# Patient Record
Sex: MALE | Race: WHITE
[De-identification: names, ages, dates, MRNs, and addresses within clinical notes are randomized per-mention and may not be internally consistent; named-entity substitution may affect disease eponyms.]

---

## 2017-04-07 ENCOUNTER — HOSPITAL ENCOUNTER (OUTPATIENT)
Dept: HOSPITAL 79 - EMI | Age: 67
End: 2017-04-07
Attending: INTERNAL MEDICINE
Payer: COMMERCIAL

## 2017-04-07 DIAGNOSIS — M25.561: Primary | ICD-10-CM

## 2017-04-07 DIAGNOSIS — M51.36: ICD-10-CM

## 2017-04-07 DIAGNOSIS — M47.816: ICD-10-CM

## 2017-04-07 DIAGNOSIS — M99.04: ICD-10-CM

## 2017-04-07 DIAGNOSIS — M25.562: ICD-10-CM

## 2017-08-25 ENCOUNTER — OFFICE VISIT (OUTPATIENT)
Dept: UROLOGY | Facility: CLINIC | Age: 67
End: 2017-08-25

## 2017-08-25 VITALS
DIASTOLIC BLOOD PRESSURE: 75 MMHG | HEIGHT: 67 IN | HEART RATE: 67 BPM | SYSTOLIC BLOOD PRESSURE: 120 MMHG | WEIGHT: 189 LBS | BODY MASS INDEX: 29.66 KG/M2

## 2017-08-25 DIAGNOSIS — N20.0 KIDNEY STONES: Primary | ICD-10-CM

## 2017-08-25 LAB
BILIRUB BLD-MCNC: NORMAL MG/DL
CLARITY, POC: CLEAR
COLOR UR: YELLOW
GLUCOSE UR STRIP-MCNC: NEGATIVE MG/DL
KETONES UR QL: NEGATIVE
LEUKOCYTE EST, POC: NEGATIVE
NITRITE UR-MCNC: NEGATIVE MG/ML
PH UR: 6 [PH] (ref 5–8)
PROT UR STRIP-MCNC: NEGATIVE MG/DL
RBC # UR STRIP: NEGATIVE /UL
SP GR UR: 1.02 (ref 1–1.03)
UROBILINOGEN UR QL: NORMAL

## 2017-08-25 PROCEDURE — 99204 OFFICE O/P NEW MOD 45 MIN: CPT | Performed by: NURSE PRACTITIONER

## 2017-08-25 PROCEDURE — 81003 URINALYSIS AUTO W/O SCOPE: CPT | Performed by: NURSE PRACTITIONER

## 2017-08-25 RX ORDER — GABAPENTIN 300 MG/1
CAPSULE ORAL
Refills: 0 | COMMUNITY
Start: 2017-08-03

## 2017-08-25 RX ORDER — OLMESARTAN MEDOXOMIL 20 MG/1
40 TABLET ORAL DAILY
COMMUNITY

## 2017-08-25 RX ORDER — TRAMADOL HYDROCHLORIDE 50 MG/1
TABLET ORAL
Refills: 0 | COMMUNITY
Start: 2017-08-03

## 2017-08-25 NOTE — PROGRESS NOTES
Chief Complaint:          Chief Complaint   Patient presents with   • Flank Pain       HPI:   66 y.o. male being seen with his wife today for history of left sided pain secondary to a 3.88 cm left staghorn calculus and multiple additional left renal stones involving the left mid to lower pole of the kidney diagnosed per CT scan of the abdomen and pelvis stone study at [facility] in [facility] on 07/24/2017 ordered by Dr. Segura. Patient states he has difficulty getting to [place] and would like to be seen here. He does complain of some left back pain but denies any difficulty with urination. He reports he has passed several small stone fragments. He has had a ESWL in the past for treatment of a small stone. Patient states he is scheduled for a consult by a urologist prior to being scheduled for a percutaneous nephrolithotomy for treatment of the large staghorn stone. Patient reports also he has had an elevated PSA of 9.91 but had a repeat PSA a few days ago and does not know the result of that PSA completed while he was in [place] and was recommended to have a prostate biopsy.    HPI        Past Medical History:        Past Medical History:   Diagnosis Date   • Heart attack    • Hypertension          Current Meds:     Current Outpatient Prescriptions   Medication Sig Dispense Refill   • gabapentin (NEURONTIN) 300 MG capsule take 1 capsule by mouth three times a day  0   • olmesartan (BENICAR) 20 MG tablet Take 40 mg by mouth Daily.     • traMADol (ULTRAM) 50 MG tablet take 1 tablet by mouth twice a day if needed  0     No current facility-administered medications for this visit.         Allergies:      Allergies   Allergen Reactions   • Arithmin [Antazoline]    • Morphine And Related         Past Surgical History:     Past Surgical History:   Procedure Laterality Date   • CARDIAC SURGERY     • CARPAL TUNNEL RELEASE WITH CUBITAL TUNNEL RELEASE Bilateral    • CYSTOSCOPY W/ URETEROSCOPY W/  LITHOTRIPSY           Social History:     Social History     Social History   • Marital status: Unknown     Spouse name: N/A   • Number of children: N/A   • Years of education: N/A     Occupational History   • Not on file.     Social History Main Topics   • Smoking status: Current Every Day Smoker     Types: Pipe   • Smokeless tobacco: Never Used   • Alcohol use Yes   • Drug use: No   • Sexual activity: Not on file     Other Topics Concern   • Not on file     Social History Narrative   • No narrative on file       Family History:     Family History   Problem Relation Age of Onset   • Heart disease Father    • Stroke Father    • Heart disease Mother        Review of Systems:     Review of Systems   Constitutional: Negative for chills, fatigue and fever.   Respiratory: Negative for cough, shortness of breath and wheezing.    Cardiovascular: Negative for leg swelling.   Gastrointestinal: Positive for abdominal pain. Negative for nausea and vomiting.   Musculoskeletal: Positive for back pain. Negative for joint swelling.   Neurological: Negative for dizziness and headaches.   Psychiatric/Behavioral: Negative for confusion.       Physical Exam:     Physical Exam   Constitutional: He is oriented to person, place, and time. He appears well-developed and well-nourished. No distress.   Abdominal: Soft. Bowel sounds are normal. He exhibits no distension and no mass. There is no tenderness. There is no rebound and no guarding. No hernia.   Musculoskeletal: Normal range of motion.   Neurological: He is alert and oriented to person, place, and time.   Skin: Skin is warm and dry. No rash noted. He is not diaphoretic. No pallor.   Psychiatric: He has a normal mood and affect. His behavior is normal. Judgment and thought content normal.   Nursing note and vitals reviewed.      Procedure:     No notes on file      Assessment:     Encounter Diagnosis   Name Primary?   • Kidney stones Yes     Orders Placed This Encounter   Procedures    • POC Urinalysis Dipstick, Automated       Plan:   Recommend the patient keep his scheduled appointment for in the percutaneous nephrolithotomy for treatment of 3.88 cm left renal stone along with other smaller stones within the left kidney. Further recommend he discussed with his surgeon the possibility of having the prostate biopsy while he is under sedation having the left renal stone treated. Patient states he would talk to his provider regarding the possibility of the prostate biopsy while asleep. Further informed patient after he has been cleared by his doctors in [place] he could have his records transferred to the services of Dr. Montes since this would be easier commute for him and his wife.    Counseling was given to patient and family for the following topics diagnostic results, patient and family education, impressions and risks and benefits of treatment options. and the interim medical history and current results were reviewed.  A treatment plan with follow-up was made. Total time of the encounter was 45 minutes and 45 minutes were spent discussing Kidney stones [N20.0] face-to-face.       This document has been electronically signed by LONDON Fernandez August 25, 2017 2:30 PM

## 2017-10-10 ENCOUNTER — OFFICE VISIT (OUTPATIENT)
Dept: UROLOGY | Facility: CLINIC | Age: 67
End: 2017-10-10

## 2017-10-10 DIAGNOSIS — N20.0 RENAL CALCULUS: Primary | ICD-10-CM

## 2017-10-10 DIAGNOSIS — R97.20 ELEVATED PROSTATE SPECIFIC ANTIGEN (PSA): ICD-10-CM

## 2017-10-10 PROCEDURE — 99214 OFFICE O/P EST MOD 30 MIN: CPT | Performed by: UROLOGY

## 2017-10-10 NOTE — PROGRESS NOTES
Chief Complaint:          Chief Complaint   Patient presents with   • Nephrolithiasis       HPI:   66 y.o. male.  66-year-old white male who was referred to the [facility] for percutaneous nephrostolithotomy on the area and it was 3.8 cm stone.  He also had a high PSA and was to have a prostate biopsy apparently is referred immediately postop from  for tube removal and I spoke personally with his doctor Dr. Marcial Valladares.  He indicated there was a stent in he left the fragment easy to do ureteroscopy to obtain the last fragment he did not recommend a nephrostogram we discussed the fact that the Myrick I removed it without complication.  I'll see him back in a month to address his prostate there is no fevers chills nausea vomiting etc.        Past Medical History:        Past Medical History:   Diagnosis Date   • Heart attack    • Hypertension          Current Meds:     Current Outpatient Prescriptions   Medication Sig Dispense Refill   • gabapentin (NEURONTIN) 300 MG capsule take 1 capsule by mouth three times a day  0   • olmesartan (BENICAR) 20 MG tablet Take 40 mg by mouth Daily.     • traMADol (ULTRAM) 50 MG tablet take 1 tablet by mouth twice a day if needed  0     No current facility-administered medications for this visit.         Allergies:      Allergies   Allergen Reactions   • Arithmin [Antazoline]    • Morphine And Related         Past Surgical History:     Past Surgical History:   Procedure Laterality Date   • CARDIAC SURGERY     • CARPAL TUNNEL RELEASE WITH CUBITAL TUNNEL RELEASE Bilateral    • CYSTOSCOPY W/ URETEROSCOPY W/ LITHOTRIPSY           Social History:     Social History     Social History   • Marital status: Unknown     Spouse name: N/A   • Number of children: N/A   • Years of education: N/A     Occupational History   • Not on file.     Social History Main Topics   • Smoking status: Current Every Day Smoker     Types: Pipe   • Smokeless tobacco: Never Used   • Alcohol use Yes   •  Drug use: No   • Sexual activity: Not on file     Other Topics Concern   • Not on file     Social History Narrative       Family History:     Family History   Problem Relation Age of Onset   • Heart disease Father    • Stroke Father    • Heart disease Mother        Review of Systems:     Review of Systems   Constitutional: Negative.    HENT: Negative.    Eyes: Negative.    Respiratory: Negative.    Cardiovascular: Negative.    Gastrointestinal: Negative.    Endocrine: Negative.    Musculoskeletal: Negative.    Allergic/Immunologic: Negative.    Neurological: Negative.    Hematological: Negative.    Psychiatric/Behavioral: Negative.        Physical Exam:     Physical Exam   Constitutional: He is oriented to person, place, and time. He appears well-developed and well-nourished.   HENT:   Head: Normocephalic and atraumatic.   Eyes: Conjunctivae and EOM are normal. Pupils are equal, round, and reactive to light.   Neck: Normal range of motion.   Cardiovascular: Normal rate, regular rhythm, normal heart sounds and intact distal pulses.    Pulmonary/Chest: Effort normal and breath sounds normal.   Abdominal: Soft. Bowel sounds are normal.   Musculoskeletal: Normal range of motion.   Neurological: He is alert and oriented to person, place, and time. He has normal reflexes.   Skin: Skin is warm and dry.   Psychiatric: He has a normal mood and affect. His behavior is normal. Judgment and thought content normal.   Nursing note and vitals reviewed.      Procedure:       Assessment:   No diagnosis found.  No orders of the defined types were placed in this encounter.      Plan:   Staghorn calculus status post percutaneous nephrostolithotomy has an indwelling double-J stent endoscopically go back to the [facility] for intervention  Elevated prostate specific antigen-we discussed the diagnosis of elevated prostate-specific antigen.  I explained the pathophysiology of PSA.  It is a serine protease that's function in the  male reproductive tract is to facilitate the liquefaction of semen.  It is for this reason the body does not want it freely floating in the serum and why typically bound tightly to albumin.  We discussed why we used both a PSA free and total to determine the need for more aggressive therapy I discussed the normal range.  Additionally it was in the range of 1-4 but more recently has been downgraded to something less than 2 or even approaching 1.  I discussed the risk of family history particularly the fact that the average male has a 14% risk of prostate cancer and that in the face of a positive diagnosis in a father it will tablet and any other first-generation relative continued tablet insofar that a father and brother with prostate cancer will produce almost a 50% risk of prostate cancer.  I discussed the use of the temporal use of PSA as the best option for monitoring.  We also discussed the fact that an elevated PSA is an isolated event does not mean that this is prostate cancer and should not engender worry in this regard. I discussed other things that can elevate PSA including constipation, prostatitis, infection, recent intercourse etc. as well as the risks and benefits associated with this.  Also discussed the fact that this is with a dilutional test and as a consequence of such were present produce slight variations on a single specimen.  Further discussed the risks and benefits of a prostate biopsy as well.           This document has been electronically signed by DEBORAH ROBERSON MD October 10, 2017 10:46 AM

## 2017-10-11 PROBLEM — N20.0 RENAL CALCULUS: Status: ACTIVE | Noted: 2017-10-11

## 2017-10-11 PROBLEM — R97.20 ELEVATED PROSTATE SPECIFIC ANTIGEN (PSA): Status: ACTIVE | Noted: 2017-10-11

## 2017-11-17 ENCOUNTER — OFFICE VISIT (OUTPATIENT)
Dept: UROLOGY | Facility: CLINIC | Age: 67
End: 2017-11-17

## 2017-11-17 VITALS
WEIGHT: 189 LBS | HEART RATE: 67 BPM | BODY MASS INDEX: 29.66 KG/M2 | DIASTOLIC BLOOD PRESSURE: 75 MMHG | HEIGHT: 67 IN | SYSTOLIC BLOOD PRESSURE: 120 MMHG

## 2017-11-17 DIAGNOSIS — R97.20 ELEVATED PROSTATE SPECIFIC ANTIGEN (PSA): Primary | ICD-10-CM

## 2017-11-17 DIAGNOSIS — N20.0 RENAL CALCULUS: ICD-10-CM

## 2017-11-17 PROCEDURE — 36415 COLL VENOUS BLD VENIPUNCTURE: CPT | Performed by: UROLOGY

## 2017-11-17 PROCEDURE — 84153 ASSAY OF PSA TOTAL: CPT | Performed by: UROLOGY

## 2017-11-17 PROCEDURE — 84154 ASSAY OF PSA FREE: CPT | Performed by: UROLOGY

## 2017-11-17 PROCEDURE — 99213 OFFICE O/P EST LOW 20 MIN: CPT | Performed by: UROLOGY

## 2017-11-17 NOTE — PROGRESS NOTES
Chief Complaint:          Chief Complaint   Patient presents with   • Nephrolithiasis       HPI:   66 y.o. male.  66-year-old white male who was referred to the [facility] for percutaneous nephrostolithotomy on the area and it was 3.8 cm stone.  He also had a high PSA and was to have a prostate biopsy apparently is referred immediately postop from  for tube removal and I spoke personally with his doctor Dr. Marcial Valladares.  He indicated there was a stent in he left the fragment easy to do ureteroscopy to obtain the last fragment he did not recommend a nephrostogram we discussed the fact that the Myrick I removed it without complication.  I'll see him back in a month to address his prostate there is no fevers chills nausea vomiting etc.   He returns today he's now stone free he had a percutaneous nephrostolithotomy at the [facility] followed by ureteroscopy he is due to have a checkup an ultrasound on 12 8.  He was on alpha blockade but he stopped he currently has no lower urinary tract symptomatology no blood in the urine no family history of prostate cancer his PSA has gone from 9-76 to have that evaluated.  He has severe hemorrhoids since his surgery.        Past Medical History:        Past Medical History:   Diagnosis Date   • Heart attack    • Hypertension          Current Meds:     Current Outpatient Prescriptions   Medication Sig Dispense Refill   • gabapentin (NEURONTIN) 300 MG capsule take 1 capsule by mouth three times a day  0   • olmesartan (BENICAR) 20 MG tablet Take 40 mg by mouth Daily.     • traMADol (ULTRAM) 50 MG tablet take 1 tablet by mouth twice a day if needed  0     No current facility-administered medications for this visit.         Allergies:      Allergies   Allergen Reactions   • Arithmin [Antazoline]    • Morphine And Related         Past Surgical History:     Past Surgical History:   Procedure Laterality Date   • CARDIAC SURGERY     • CARPAL TUNNEL RELEASE WITH  CUBITAL TUNNEL RELEASE Bilateral    • CYSTOSCOPY W/ URETEROSCOPY W/ LITHOTRIPSY           Social History:     Social History     Social History   • Marital status: Unknown     Spouse name: N/A   • Number of children: N/A   • Years of education: N/A     Occupational History   • Not on file.     Social History Main Topics   • Smoking status: Current Every Day Smoker     Types: Pipe   • Smokeless tobacco: Never Used   • Alcohol use Yes   • Drug use: No   • Sexual activity: Not on file     Other Topics Concern   • Not on file     Social History Narrative       Family History:     Family History   Problem Relation Age of Onset   • Heart disease Father    • Stroke Father    • Heart disease Mother        Review of Systems:     Review of Systems   Constitutional: Negative.    HENT: Negative.    Eyes: Negative.    Respiratory: Negative.    Cardiovascular: Negative.    Gastrointestinal: Negative.    Endocrine: Negative.    Musculoskeletal: Negative.    Allergic/Immunologic: Negative.    Neurological: Negative.    Hematological: Negative.    Psychiatric/Behavioral: Negative.        Physical Exam:     Physical Exam   Constitutional: He is oriented to person, place, and time. He appears well-developed and well-nourished.   HENT:   Head: Normocephalic and atraumatic.   Eyes: Conjunctivae and EOM are normal. Pupils are equal, round, and reactive to light.   Neck: Normal range of motion.   Cardiovascular: Normal rate, regular rhythm, normal heart sounds and intact distal pulses.    Pulmonary/Chest: Effort normal and breath sounds normal.   Abdominal: Soft. Bowel sounds are normal.   Genitourinary: Rectum normal, prostate normal and penis normal.   Genitourinary Comments: Soft nontender abdomen with no organomegaly, rigidity, or tenderness.  He has normal external genitalia and uncircumcised phallus with a freely movable foreskin bilaterally descended testes without masses there is no inguinal hernias adenopathy or abnormalities he  had good rectal tone and a large smooth firm prostate.  There is no nodularity or any suspicious rectal abnormalities     Musculoskeletal: Normal range of motion.   Neurological: He is alert and oriented to person, place, and time. He has normal reflexes.   Skin: Skin is warm and dry.   Psychiatric: He has a normal mood and affect. His behavior is normal. Judgment and thought content normal.   Nursing note and vitals reviewed.      Procedure:       Assessment:   No diagnosis found.  No orders of the defined types were placed in this encounter.      Plan:   Elevated prostate specific antigen-we discussed the diagnosis of elevated prostate-specific antigen.  I explained the pathophysiology of PSA.  It is a serine protease that's function in the male reproductive tract is to facilitate the liquefaction of semen.  It is for this reason the body does not want it freely floating in the serum and why typically bound tightly to albumin.  We discussed why we used both a PSA free and total to determine the need for more aggressive therapy I discussed the normal range.  Additionally it was in the range of 1-4 but more recently has been downgraded to something less than 2 or even approaching 1.  I discussed the risk of family history particularly the fact that the average male has a 14% risk of prostate cancer and that in the face of a positive diagnosis in a father it will tablet and any other first-generation relative continued tablet insofar that a father and brother with prostate cancer will produce almost a 50% risk of prostate cancer.  I discussed the use of the temporal use of PSA as the best option for monitoring.  We also discussed the fact that an elevated PSA is an isolated event does not mean that this is prostate cancer and should not engender worry in this regard. I discussed other things that can elevate PSA including constipation, prostatitis, infection, recent intercourse etc. as well as the risks and benefits  associated with this.  Also discussed the fact that this is with a dilutional test and as a consequence of such were present produce slight variations on a single specimen.  Further discussed the risks and benefits of a prostate biopsy as well.  Renal stone status post treatment           This document has been electronically signed by DEBORAH ROBERSON MD November 17, 2017 9:58 AM

## 2017-11-19 LAB
PSA FREE MFR SERPL: 23.2 %
PSA FREE SERPL-MCNC: 1.65 NG/ML
PSA SERPL-MCNC: 7.1 NG/ML (ref 0–4)

## 2017-12-01 ENCOUNTER — OFFICE VISIT (OUTPATIENT)
Dept: UROLOGY | Facility: CLINIC | Age: 67
End: 2017-12-01

## 2017-12-01 DIAGNOSIS — R97.20 ELEVATED PROSTATE SPECIFIC ANTIGEN (PSA): Primary | ICD-10-CM

## 2017-12-01 PROCEDURE — 99213 OFFICE O/P EST LOW 20 MIN: CPT | Performed by: UROLOGY

## 2017-12-01 NOTE — PROGRESS NOTES
Chief Complaint:          Chief Complaint   Patient presents with   • Elevated PSA       HPI:   67 y.o. male.  66-year-old white male who was referred to the [facility] for percutaneous nephrostolithotomy on the area and it was 3.8 cm stone.  He also had a high PSA and was to have a prostate biopsy apparently is referred immediately postop from  for tube removal and I spoke personally with his doctor Dr. Marcial Valladares.  He indicated there was a stent in he left the fragment easy to do ureteroscopy to obtain the last fragment he did not recommend a nephrostogram we discussed the fact that the Myrick I removed it without complication.  I'll see him back in a month to address his prostate there is no fevers chills nausea vomiting etc.   He returns today he's now stone free he had a percutaneous nephrostolithotomy at the [facility] followed by ureteroscopy he is due to have a checkup an ultrasound on 12 8.  He was on alpha blockade but he stopped he currently has no lower urinary tract symptomatology no blood in the urine no family history of prostate cancer his PSA has gone from 9-76 to have that evaluated.  He has severe hemorrhoids since his surgery.  He returns today's repeat PSA is now down to 7 with a free PSA of 23.2% I believe it's going down the discussed the options I do not recommend intervention which would be consistent with the see back in 6 months I gave him reassurance        Past Medical History:        Past Medical History:   Diagnosis Date   • Heart attack    • Hypertension          Current Meds:     Current Outpatient Prescriptions   Medication Sig Dispense Refill   • gabapentin (NEURONTIN) 300 MG capsule take 1 capsule by mouth three times a day  0   • olmesartan (BENICAR) 20 MG tablet Take 40 mg by mouth Daily.     • traMADol (ULTRAM) 50 MG tablet take 1 tablet by mouth twice a day if needed  0     No current facility-administered medications for this visit.          Allergies:      Allergies   Allergen Reactions   • Arithmin [Antazoline]    • Morphine And Related         Past Surgical History:     Past Surgical History:   Procedure Laterality Date   • CARDIAC SURGERY     • CARPAL TUNNEL RELEASE WITH CUBITAL TUNNEL RELEASE Bilateral    • CYSTOSCOPY W/ URETEROSCOPY W/ LITHOTRIPSY           Social History:     Social History     Social History   • Marital status: Unknown     Spouse name: N/A   • Number of children: N/A   • Years of education: N/A     Occupational History   • Not on file.     Social History Main Topics   • Smoking status: Current Every Day Smoker     Types: Pipe   • Smokeless tobacco: Never Used   • Alcohol use Yes   • Drug use: No   • Sexual activity: Not on file     Other Topics Concern   • Not on file     Social History Narrative       Family History:     Family History   Problem Relation Age of Onset   • Heart disease Father    • Stroke Father    • Heart disease Mother        Review of Systems:     Review of Systems   Constitutional: Negative.    HENT: Negative.    Eyes: Negative.    Respiratory: Negative.    Cardiovascular: Negative.    Gastrointestinal: Negative.    Endocrine: Negative.    Musculoskeletal: Negative.    Allergic/Immunologic: Negative.    Neurological: Negative.    Hematological: Negative.    Psychiatric/Behavioral: Negative.        Physical Exam:     Physical Exam   Constitutional: He is oriented to person, place, and time. He appears well-developed and well-nourished.   HENT:   Head: Normocephalic and atraumatic.   Eyes: Conjunctivae and EOM are normal. Pupils are equal, round, and reactive to light.   Neck: Normal range of motion.   Cardiovascular: Normal rate, regular rhythm, normal heart sounds and intact distal pulses.    Pulmonary/Chest: Effort normal and breath sounds normal.   Abdominal: Soft. Bowel sounds are normal.   Musculoskeletal: Normal range of motion.   Neurological: He is alert and oriented to person, place, and time. He  has normal reflexes.   Skin: Skin is warm and dry.   Psychiatric: He has a normal mood and affect. His behavior is normal. Judgment and thought content normal.   Nursing note and vitals reviewed.      Procedure:       Assessment:   No diagnosis found.  No orders of the defined types were placed in this encounter.      Plan:   Elevated prostate specific antigen-we discussed the diagnosis of elevated prostate-specific antigen.  I explained the pathophysiology of PSA.  It is a serine protease that's function in the male reproductive tract is to facilitate the liquefaction of semen.  It is for this reason the body does not want it freely floating in the serum and why typically bound tightly to albumin.  We discussed why we used both a PSA free and total to determine the need for more aggressive therapy I discussed the normal range.  Additionally it was in the range of 1-4 but more recently has been downgraded to something less than 2 or even approaching 1.  I discussed the risk of family history particularly the fact that the average male has a 14% risk of prostate cancer and that in the face of a positive diagnosis in a father it will tablet and any other first-generation relative continued tablet insofar that a father and brother with prostate cancer will produce almost a 50% risk of prostate cancer.  I discussed the use of the temporal use of PSA as the best option for monitoring.  We also discussed the fact that an elevated PSA is an isolated event does not mean that this is prostate cancer and should not engender worry in this regard. I discussed other things that can elevate PSA including constipation, prostatitis, infection, recent intercourse etc. as well as the risks and benefits associated with this.  Also discussed the fact that this is with a dilutional test and as a consequence of such were present produce slight variations on a single specimen.  Further discussed the risks and benefits of a prostate biopsy  as well.  PSA is not dropping that's consistent with his diagnosis of BPH and he's had a large amount of urologic intervention which could easily be responsible for making the PSA go up           This document has been electronically signed by DEBORAH ROBERSON MD December 1, 2017 9:48 AM

## 2018-06-01 ENCOUNTER — OFFICE VISIT (OUTPATIENT)
Dept: UROLOGY | Facility: CLINIC | Age: 68
End: 2018-06-01

## 2018-06-01 VITALS — WEIGHT: 189 LBS | HEIGHT: 67 IN | BODY MASS INDEX: 29.66 KG/M2

## 2018-06-01 DIAGNOSIS — R97.20 ELEVATED PROSTATE SPECIFIC ANTIGEN (PSA): Primary | ICD-10-CM

## 2018-06-01 DIAGNOSIS — N20.0 RENAL CALCULUS: ICD-10-CM

## 2018-06-01 LAB — PSA SERPL-MCNC: 4.86 NG/ML (ref 0–4)

## 2018-06-01 PROCEDURE — 84153 ASSAY OF PSA TOTAL: CPT | Performed by: UROLOGY

## 2018-06-01 PROCEDURE — 36415 COLL VENOUS BLD VENIPUNCTURE: CPT | Performed by: UROLOGY

## 2018-06-01 PROCEDURE — 99213 OFFICE O/P EST LOW 20 MIN: CPT | Performed by: UROLOGY

## 2018-06-01 PROCEDURE — 99406 BEHAV CHNG SMOKING 3-10 MIN: CPT | Performed by: UROLOGY

## 2018-06-01 RX ORDER — TAMSULOSIN HYDROCHLORIDE 0.4 MG/1
CAPSULE ORAL
Refills: 0 | COMMUNITY
Start: 2018-04-26

## 2018-06-01 RX ORDER — ACETAMINOPHEN 160 MG/1
100 TABLET, CHEWABLE ORAL DAILY
Refills: 0 | COMMUNITY
Start: 2018-03-05

## 2018-06-01 RX ORDER — LISINOPRIL AND HYDROCHLOROTHIAZIDE 20; 12.5 MG/1; MG/1
1 TABLET ORAL 2 TIMES DAILY
Refills: 0 | COMMUNITY
Start: 2018-03-05

## 2018-06-01 RX ORDER — ROSUVASTATIN CALCIUM 10 MG/1
10 TABLET, COATED ORAL DAILY
Refills: 0 | COMMUNITY
Start: 2018-03-05

## 2018-06-01 NOTE — PROGRESS NOTES
Chief Complaint:          Chief Complaint   Patient presents with   • Elevated PSA       HPI:   67 y.o. male.  66-year-old white male who was referred to the [facility] for percutaneous nephrostolithotomy on the area and it was 3.8 cm stone.  He also had a high PSA and was to have a prostate biopsy apparently is referred immediately postop from  for tube removal and I spoke personally with his doctor Dr. Marcial Valladares.  He indicated there was a stent in he left the fragment easy to do ureteroscopy to obtain the last fragment he did not recommend a nephrostogram we discussed the fact that the [facility] I removed it without complication.  I'll see him back in a month to address his prostate there is no fevers chills nausea vomiting etc.   He returns today he's now stone free he had a percutaneous nephrostolithotomy at the [facility] followed by ureteroscopy he is due to have a checkup an ultrasound on 12 8.  He was on alpha blockade but he stopped he currently has no lower urinary tract symptomatology no blood in the urine no family history of prostate cancer his PSA has gone from 9-76 to have that evaluated.  He has severe hemorrhoids since his surgery.  He returns today's repeat PSA is now down to 7 with a free PSA of 23.2% I believe it's going down the discussed the options I do not recommend intervention which would be consistent with the see back in 6 months I gave him reassurance  He returns today's doing great he seen the urologist  would like to follow here this is certainly reasonable he's having no prostate complaints or problems his exams unremarkable I gave him reassurance I'll see him back following his evaluation [facility].  Specifically, there is no burning, blood in the urine, discharge, fevers or chills.    Past Medical History:        Past Medical History:   Diagnosis Date   • Heart attack    • Hypertension          Current Meds:     Current Outpatient Prescriptions   Medication Sig  Dispense Refill   • gabapentin (NEURONTIN) 300 MG capsule take 1 capsule by mouth three times a day  0   • lisinopril-hydrochlorothiazide (PRINZIDE,ZESTORETIC) 20-12.5 MG per tablet Take 1 tablet by mouth 2 (Two) Times a Day.  0   • olmesartan (BENICAR) 20 MG tablet Take 40 mg by mouth Daily.     • RA COENZYME Q-10 100 MG capsule Take 100 mg by mouth Daily.  0   • rosuvastatin (CRESTOR) 10 MG tablet Take 10 mg by mouth Daily.  0   • tamsulosin (FLOMAX) 0.4 MG capsule 24 hr capsule   0   • traMADol (ULTRAM) 50 MG tablet take 1 tablet by mouth twice a day if needed  0     No current facility-administered medications for this visit.         Allergies:      Allergies   Allergen Reactions   • Arithmin [Antazoline]    • Morphine And Related         Past Surgical History:     Past Surgical History:   Procedure Laterality Date   • CARDIAC SURGERY     • CARPAL TUNNEL RELEASE WITH CUBITAL TUNNEL RELEASE Bilateral    • CYSTOSCOPY W/ URETEROSCOPY W/ LITHOTRIPSY           Social History:     Social History     Social History   • Marital status: Unknown     Spouse name: N/A   • Number of children: N/A   • Years of education: N/A     Occupational History   • Not on file.     Social History Main Topics   • Smoking status: Current Every Day Smoker     Types: Pipe   • Smokeless tobacco: Never Used   • Alcohol use Yes   • Drug use: No   • Sexual activity: Not on file     Other Topics Concern   • Not on file     Social History Narrative   • No narrative on file       Family History:     Family History   Problem Relation Age of Onset   • Heart disease Father    • Stroke Father    • Heart disease Mother        Review of Systems:     Review of Systems   Constitutional: Negative.    HENT: Negative.    Eyes: Negative.    Respiratory: Negative.    Cardiovascular: Negative.    Gastrointestinal: Negative.    Endocrine: Negative.    Musculoskeletal: Negative.    Allergic/Immunologic: Negative.    Neurological: Negative.    Hematological:  Negative.    Psychiatric/Behavioral: Negative.        Physical Exam:     Physical Exam   Constitutional: He is oriented to person, place, and time. He appears well-developed and well-nourished.   HENT:   Head: Normocephalic and atraumatic.   Eyes: Conjunctivae and EOM are normal. Pupils are equal, round, and reactive to light.   Neck: Normal range of motion.   Cardiovascular: Normal rate, regular rhythm, normal heart sounds and intact distal pulses.    Pulmonary/Chest: Effort normal and breath sounds normal.   Abdominal: Soft. Bowel sounds are normal.   Musculoskeletal: Normal range of motion.   Neurological: He is alert and oriented to person, place, and time. He has normal reflexes.   Skin: Skin is warm and dry.   Psychiatric: He has a normal mood and affect. His behavior is normal. Judgment and thought content normal.   Nursing note and vitals reviewed.      I have reviewed the following portions of the patient's history: allergies, current medications, past family history, past medical history, past social history, past surgical history, problem list and ROS and confirm it's accurate.      Procedure:       Assessment/Plan:   Elevated prostate specific antigen-we discussed the diagnosis of elevated prostate-specific antigen.  I explained the pathophysiology of PSA.  It is a serine protease that's function in the male reproductive tract is to facilitate the liquefaction of semen.  It is for this reason the body does not want it freely floating in the serum and why typically bound tightly to albumin.  We discussed why we used both a PSA free and total to determine the need for more aggressive therapy I discussed the normal range.  Additionally it was in the range of 1-4 but more recently has been downgraded to something less than 2 or even approaching 1.  I discussed the risk of family history particularly the fact that the average male has a 14% risk of prostate cancer and that in the face of a positive diagnosis  in a father it will tablet and any other first-generation relative continued tablet insofar that a father and brother with prostate cancer will produce almost a 50% risk of prostate cancer.  I discussed the use of the temporal use of PSA as the best option for monitoring.  We also discussed the fact that an elevated PSA is an isolated event does not mean that this is prostate cancer and should not engender worry in this regard. I discussed other things that can elevate PSA including constipation, prostatitis, infection, recent intercourse etc. as well as the risks and benefits associated with this.  Also discussed the fact that this is with a dilutional test and as a consequence of such were present produce slight variations on a single specimen.  Further discussed the risks and benefits of a prostate biopsy as well.  PSAs pending  Renal calculus-we discussed the presence of the stone we discussed the various therapeutic options available including percutaneous nephrostolithotomy, lithotripsy.  We discussed the risks of lithotripsy including the passage of stones the development of a large string of stones in the distal ureter known as Steinstrasse.  In the 3% incidence of that we will need to proceed with a ureteroscopy for obstructing fragments.  Extremely rare incidence of renal hematoma.  And the significance of this.  We discussed the absolute relative indicators for intervention including the presence of sepsis, and pain we cannot control is the primary need for urgent intervention.  We discussed placement of a stent if indicated and the management of the stent as well.   follows at the [facility]     Patient's Body mass index is 29.6 kg/m². BMI is above normal parameters. Recommendations include: educational material.      I advised the patient of the risks in continuing to use tobacco, and I provided this patient with smoking cessation educational materials.    During this visit, I spent 3-10  minutes counseling the patient regarding smoking cessation.      This document has been electronically signed by DEBORAH ROBERSON MD June 1, 2018 9:52 AM

## 2018-11-26 ENCOUNTER — TELEPHONE (OUTPATIENT)
Dept: UROLOGY | Facility: CLINIC | Age: 68
End: 2018-11-26

## 2018-11-26 ENCOUNTER — OFFICE VISIT (OUTPATIENT)
Dept: UROLOGY | Facility: CLINIC | Age: 68
End: 2018-11-26

## 2018-11-26 VITALS — BODY MASS INDEX: 29.66 KG/M2 | HEIGHT: 67 IN | WEIGHT: 189 LBS

## 2018-11-26 DIAGNOSIS — R97.20 ELEVATED PROSTATE SPECIFIC ANTIGEN (PSA): ICD-10-CM

## 2018-11-26 DIAGNOSIS — N40.0 BPH WITHOUT URINARY OBSTRUCTION: Primary | ICD-10-CM

## 2018-11-26 LAB — PSA SERPL-MCNC: 5.48 NG/ML (ref 0–4)

## 2018-11-26 PROCEDURE — 36415 COLL VENOUS BLD VENIPUNCTURE: CPT | Performed by: UROLOGY

## 2018-11-26 PROCEDURE — 99213 OFFICE O/P EST LOW 20 MIN: CPT | Performed by: UROLOGY

## 2018-11-26 PROCEDURE — 84153 ASSAY OF PSA TOTAL: CPT | Performed by: UROLOGY

## 2018-11-26 RX ORDER — ASPIRIN 325 MG
325 TABLET ORAL DAILY
COMMUNITY

## 2018-11-26 RX ORDER — EZETIMIBE 10 MG/1
10 TABLET ORAL DAILY
COMMUNITY

## 2018-11-26 NOTE — PROGRESS NOTES
Chief Complaint:          Chief Complaint   Patient presents with   • Elevated PSA       HPI:   68 y.o. male.  68-year-old white male who was referred to the [facility] for percutaneous nephrostolithotomy on the area and it was 3.8 cm stone.  He also had a high PSA and was to have a prostate biopsy apparently is referred immediately postop from  for tube removal and I spoke personally with his doctor Dr. Marcial Valladares.  He indicated there was a stent in he left the fragment easy to do ureteroscopy to obtain the last fragment he did not recommend a nephrostogram we discussed the fact that the [facility] I removed it without complication.  I'll see him back in a month to address his prostate there is no fevers chills nausea vomiting etc.   He returns today he's now stone free he had a percutaneous nephrostolithotomy at the [facility] followed by ureteroscopy he is due to have a checkup an ultrasound on 12 8.  He was on alpha blockade but he stopped he currently has no lower urinary tract symptomatology no blood in the urine no family history of prostate cancer his PSA has gone from 9-76 to have that evaluated.  He has severe hemorrhoids since his surgery.  He returns today's repeat PSA is now down to 7 with a free PSA of 23.2% I believe it's going down the discussed the options I do not recommend intervention which would be consistent with the see back in 6 months I gave him reassurance  He returns today's doing great he seen the urologist  would like to follow here this is certainly reasonable he's having no prostate complaints or problems his exams unremarkable I gave him reassurance I'll see him back following his evaluation [facility].  Specifically, there is no burning, blood in the urine, discharge, fevers or chills.  He returns today he looks great, feels great, no burning, blood, discharge no more stones he's gained some weight is digital rectal was unremarkable I'll see him back in 1  year       Past Medical History:        Past Medical History:   Diagnosis Date   • Heart attack (CMS/HCC)    • Hypertension          Current Meds:     Current Outpatient Medications   Medication Sig Dispense Refill   • aspirin 325 MG tablet Take 325 mg by mouth Daily.     • ezetimibe (ZETIA) 10 MG tablet Take 10 mg by mouth Daily.     • lisinopril-hydrochlorothiazide (PRINZIDE,ZESTORETIC) 20-12.5 MG per tablet Take 1 tablet by mouth 2 (Two) Times a Day.  0   • tamsulosin (FLOMAX) 0.4 MG capsule 24 hr capsule   0   • gabapentin (NEURONTIN) 300 MG capsule take 1 capsule by mouth three times a day  0   • olmesartan (BENICAR) 20 MG tablet Take 40 mg by mouth Daily.     • RA COENZYME Q-10 100 MG capsule Take 100 mg by mouth Daily.  0   • rosuvastatin (CRESTOR) 10 MG tablet Take 10 mg by mouth Daily.  0   • traMADol (ULTRAM) 50 MG tablet take 1 tablet by mouth twice a day if needed  0     No current facility-administered medications for this visit.         Allergies:      Allergies   Allergen Reactions   • Arithmin [Antazoline]    • Morphine And Related         Past Surgical History:     Past Surgical History:   Procedure Laterality Date   • CARDIAC SURGERY     • CARPAL TUNNEL RELEASE WITH CUBITAL TUNNEL RELEASE Bilateral    • CYSTOSCOPY W/ URETEROSCOPY W/ LITHOTRIPSY           Social History:     Social History     Socioeconomic History   • Marital status: Unknown     Spouse name: Not on file   • Number of children: Not on file   • Years of education: Not on file   • Highest education level: Not on file   Social Needs   • Financial resource strain: Not on file   • Food insecurity - worry: Not on file   • Food insecurity - inability: Not on file   • Transportation needs - medical: Not on file   • Transportation needs - non-medical: Not on file   Occupational History   • Not on file   Tobacco Use   • Smoking status: Current Every Day Smoker     Types: Pipe   • Smokeless tobacco: Never Used   Substance and Sexual Activity    • Alcohol use: Yes   • Drug use: No   • Sexual activity: Not on file   Other Topics Concern   • Not on file   Social History Narrative   • Not on file       Family History:     Family History   Problem Relation Age of Onset   • Heart disease Father    • Stroke Father    • Heart disease Mother        Review of Systems:     Review of Systems   Constitutional: Negative.  Negative for chills, fatigue and fever.   HENT: Negative.    Eyes: Negative.    Respiratory: Negative.  Negative for cough, shortness of breath and wheezing.    Cardiovascular: Negative.  Negative for leg swelling.   Gastrointestinal: Negative.  Negative for abdominal pain, nausea and vomiting.   Endocrine: Negative.    Musculoskeletal: Positive for joint swelling. Negative for back pain.   Allergic/Immunologic: Negative.    Neurological: Negative.  Negative for dizziness and headaches.   Hematological: Negative.    Psychiatric/Behavioral: Negative.  Negative for confusion.       Physical Exam:     Physical Exam   Constitutional: He is oriented to person, place, and time. He appears well-developed and well-nourished.   HENT:   Head: Normocephalic and atraumatic.   Eyes: Conjunctivae and EOM are normal. Pupils are equal, round, and reactive to light.   Neck: Normal range of motion.   Cardiovascular: Normal rate, regular rhythm, normal heart sounds and intact distal pulses.   Pulmonary/Chest: Effort normal and breath sounds normal.   Abdominal: Soft. Bowel sounds are normal.   Genitourinary: Rectum normal, prostate normal and penis normal.   Musculoskeletal: Normal range of motion.   Neurological: He is alert and oriented to person, place, and time. He has normal reflexes.   Skin: Skin is warm and dry.   Psychiatric: He has a normal mood and affect. His behavior is normal. Judgment and thought content normal.   Nursing note and vitals reviewed.      I have reviewed the following portions of the patient's history: allergies, current medications, past  family history, past medical history, past social history, past surgical history, problem list and ROS and confirm it's accurate.      Procedure:       Assessment/Plan:   Elevated prostate specific antigen-we discussed the diagnosis of elevated prostate-specific antigen.  I explained the pathophysiology of PSA.  It is a serine protease that's function in the male reproductive tract is to facilitate the liquefaction of semen.  It is for this reason the body does not want it freely floating in the serum and why typically bound tightly to albumin.  We discussed why we used both a PSA free and total to determine the need for more aggressive therapy I discussed the normal range.  Additionally it was in the range of 1-4 but more recently has been downgraded to something less than 2 or even approaching 1.  I discussed the risk of family history particularly the fact that the average male has a 14% risk of prostate cancer and that in the face of a positive diagnosis in a father it will tablet and any other first-generation relative continued tablet insofar that a father and brother with prostate cancer will produce almost a 50% risk of prostate cancer.  I discussed the use of the temporal use of PSA as the best option for monitoring.  We also discussed the fact that an elevated PSA is an isolated event does not mean that this is prostate cancer and should not engender worry in this regard. I discussed other things that can elevate PSA including constipation, prostatitis, infection, recent intercourse etc. as well as the risks and benefits associated with this.  Also discussed the fact that this is with a dilutional test and as a consequence of such were present produce slight variations on a single specimen.  Further discussed the risks and benefits of a prostate biopsy as well.  Repeat PSA pending     Patient's Body mass index is 29.59 kg/m². BMI is above normal parameters. Recommendations include: educational  material.          This document has been electronically signed by DEBORAH ROBERSON MD November 26, 2018 8:58 AM

## 2019-12-24 ENCOUNTER — OFFICE VISIT (OUTPATIENT)
Dept: UROLOGY | Facility: CLINIC | Age: 69
End: 2019-12-24

## 2019-12-24 VITALS — BODY MASS INDEX: 29.12 KG/M2 | WEIGHT: 186 LBS

## 2019-12-24 DIAGNOSIS — R97.20 ELEVATED PROSTATE SPECIFIC ANTIGEN (PSA): Primary | ICD-10-CM

## 2019-12-24 LAB — PSA SERPL-MCNC: 6.99 NG/ML (ref 0–4)

## 2019-12-24 PROCEDURE — 99213 OFFICE O/P EST LOW 20 MIN: CPT | Performed by: UROLOGY

## 2019-12-24 PROCEDURE — 84153 ASSAY OF PSA TOTAL: CPT | Performed by: NURSE PRACTITIONER

## 2019-12-24 PROCEDURE — 36415 COLL VENOUS BLD VENIPUNCTURE: CPT | Performed by: UROLOGY

## 2019-12-24 NOTE — PROGRESS NOTES
Chief Complaint:          Chief Complaint   Patient presents with   • Elevated PSA     1 yr f/u       HPI:   69 y.o. male who was referred to the [facility] for percutaneous nephrostolithotomy on the area and it was 3.8 cm stone.  He also had a high PSA and was to have a prostate biopsy apparently is referred immediately postop from  for tube removal and I spoke personally with his doctor Dr. Marcial Valladares.  He indicated there was a stent in he left the fragment easy to do ureteroscopy to obtain the last fragment he did not recommend a nephrostogram we discussed the fact that the [facility] I removed it without complication.  I'll see him back in a month to address his prostate there is no fevers chills nausea vomiting etc.   He returns today he's now stone free he had a percutaneous nephrostolithotomy at the [facility] followed by ureteroscopy he is due to have a checkup an ultrasound on 12 8.  He was on alpha blockade but he stopped he currently has no lower urinary tract symptomatology no blood in the urine no family history of prostate cancer his PSA has gone from 9-76 to have that evaluated.  He has severe hemorrhoids since his surgery.  He returns today's repeat PSA is now down to 7 with a free PSA of 23.2% I believe it's going down the discussed the options I do not recommend intervention which would be consistent with the see back in 6 months I gave him reassurance  He returns today's doing great he seen the urologist  would like to follow here this is certainly reasonable he's having no prostate complaints or problems his exams unremarkable I gave him reassurance I'll see him back following his evaluation [facility].  Specifically, there is no burning, blood in the urine, discharge, fevers or chills.  He returns today he looks great, feels great, no burning, blood, discharge no more stones he's gained some weight is digital rectal was unremarkable I'll see him back in 1 year  Today his last  PSA in November 2018 was 5.48 was 4.86 in June 2018 he is doing well, no symptoms.  No meds, PSAs currently pending.  Digital rectal was unremarkable with a large smooth firm prostate he had a negative stress test negative EKG and a stent and MI in 1995 I will see him back in 1 year pending the results of his laboratory parameters at this point      Past Medical History:        Past Medical History:   Diagnosis Date   • Heart attack (CMS/HCC)    • Hypertension          Current Meds:     Current Outpatient Medications   Medication Sig Dispense Refill   • aspirin 325 MG tablet Take 325 mg by mouth Daily.     • ezetimibe (ZETIA) 10 MG tablet Take 10 mg by mouth Daily.     • gabapentin (NEURONTIN) 300 MG capsule take 1 capsule by mouth three times a day  0   • lisinopril-hydrochlorothiazide (PRINZIDE,ZESTORETIC) 20-12.5 MG per tablet Take 1 tablet by mouth 2 (Two) Times a Day.  0   • olmesartan (BENICAR) 20 MG tablet Take 40 mg by mouth Daily.     • RA COENZYME Q-10 100 MG capsule Take 100 mg by mouth Daily.  0   • rosuvastatin (CRESTOR) 10 MG tablet Take 10 mg by mouth Daily.  0   • tamsulosin (FLOMAX) 0.4 MG capsule 24 hr capsule   0   • traMADol (ULTRAM) 50 MG tablet take 1 tablet by mouth twice a day if needed  0     No current facility-administered medications for this visit.         Allergies:      Allergies   Allergen Reactions   • Arithmin [Antazoline] Hives   • Morphine And Related Hives        Past Surgical History:     Past Surgical History:   Procedure Laterality Date   • CARDIAC SURGERY     • CARPAL TUNNEL RELEASE WITH CUBITAL TUNNEL RELEASE Bilateral    • CYSTOSCOPY W/ URETEROSCOPY W/ LITHOTRIPSY           Social History:     Social History     Socioeconomic History   • Marital status: Unknown     Spouse name: Not on file   • Number of children: Not on file   • Years of education: Not on file   • Highest education level: Not on file   Tobacco Use   • Smoking status: Current Every Day Smoker     Types: Pipe    • Smokeless tobacco: Never Used   Substance and Sexual Activity   • Alcohol use: Yes   • Drug use: No       Family History:     Family History   Problem Relation Age of Onset   • Heart disease Father    • Stroke Father    • Heart disease Mother        Review of Systems:     Review of Systems   Constitutional: Negative.    HENT: Negative.    Eyes: Negative.    Respiratory: Negative.    Cardiovascular: Negative.    Gastrointestinal: Negative.    Endocrine: Negative.    Musculoskeletal: Negative.    Allergic/Immunologic: Negative.    Neurological: Negative.    Hematological: Negative.    Psychiatric/Behavioral: Negative.        Physical Exam:     Physical Exam   Constitutional: He is oriented to person, place, and time. He appears well-developed and well-nourished.   HENT:   Head: Normocephalic and atraumatic.   Eyes: Pupils are equal, round, and reactive to light. Conjunctivae and EOM are normal.   Neck: Normal range of motion.   Cardiovascular: Normal rate, regular rhythm, normal heart sounds and intact distal pulses.   Pulmonary/Chest: Effort normal and breath sounds normal.   Abdominal: Soft. Bowel sounds are normal.   Musculoskeletal: Normal range of motion.   Neurological: He is alert and oriented to person, place, and time. He has normal reflexes.   Skin: Skin is warm and dry.   Psychiatric: He has a normal mood and affect. His behavior is normal. Judgment and thought content normal.   Nursing note and vitals reviewed.      I have reviewed the following portions of the patient's history: allergies, current medications, past family history, past medical history, past social history, past surgical history, problem list and ROS and confirm it's accurate.      Procedure:       Assessment/Plan:   Elevated prostate specific antigen-we discussed the diagnosis of elevated prostate-specific antigen.  I explained the pathophysiology of PSA.  It is a serine protease that's function in the male reproductive tract is to  facilitate the liquefaction of semen.  It is for this reason the body does not want it freely floating in the serum and why typically bound tightly to albumin.  We discussed why we used both a PSA free and total to determine the need for more aggressive therapy I discussed the normal range.  Additionally it was in the range of 1-4 but more recently has been downgraded to something less than 2 or even approaching 1.  I discussed the risk of family history particularly the fact that the average male has a 14% risk of prostate cancer and that in the face of a positive diagnosis in a father it will tablet and any other first-generation relative continued tablet insofar that a father and brother with prostate cancer will produce almost a 50% risk of prostate cancer.  I discussed the use of the temporal use of PSA as the best option for monitoring.  We also discussed the fact that an elevated PSA is an isolated event does not mean that this is prostate cancer and should not engender worry in this regard. I discussed other things that can elevate PSA including constipation, prostatitis, infection, recent intercourse etc. as well as the risks and benefits associated with this.  Also discussed the fact that this is with a dilutional test and as a consequence of such were present produce slight variations on a single specimen.  Further discussed the risks and benefits of a prostate biopsy as well.            Patient's There is no height or weight on file to calculate BMI. BMI is above normal parameters. Recommendations include: educational material.              This document has been electronically signed by DEBORAH ROBERSON MD December 24, 2019 8:54 AM

## 2020-12-22 ENCOUNTER — OFFICE VISIT (OUTPATIENT)
Dept: UROLOGY | Facility: CLINIC | Age: 70
End: 2020-12-22

## 2020-12-22 VITALS — HEIGHT: 67 IN | BODY MASS INDEX: 29.57 KG/M2 | WEIGHT: 188.4 LBS | TEMPERATURE: 98.9 F

## 2020-12-22 DIAGNOSIS — R97.20 ELEVATED PROSTATE SPECIFIC ANTIGEN (PSA): Primary | ICD-10-CM

## 2020-12-22 DIAGNOSIS — N20.0 RENAL CALCULUS: ICD-10-CM

## 2020-12-22 PROCEDURE — 99213 OFFICE O/P EST LOW 20 MIN: CPT | Performed by: UROLOGY

## 2020-12-22 PROCEDURE — 84153 ASSAY OF PSA TOTAL: CPT | Performed by: UROLOGY

## 2020-12-22 PROCEDURE — 84154 ASSAY OF PSA FREE: CPT | Performed by: UROLOGY

## 2020-12-22 PROCEDURE — 36415 COLL VENOUS BLD VENIPUNCTURE: CPT | Performed by: UROLOGY

## 2020-12-22 NOTE — PROGRESS NOTES
Chief Complaint:          Chief Complaint   Patient presents with   • Elevated PSA     Yearly fu        HPI:   70 y.o. male returns today very well-known to me.  He had a percutaneous nephrostolithotomy at .  I saw because beginning with an elevated PSA on December 24, 2019 it was 6.99, on November 26, 2018 it was 5.8, in June 2018 was 4.8 in November 2017 it was 7.1 with a free PSA of 23.2% he has no new stones he is up at night about twice.  He having no other complaints problems he has greater than 80 g prostate to palpation I have a free and total PSA pending I will notify him of results I will see him back in a year      Past Medical History:        Past Medical History:   Diagnosis Date   • Heart attack (CMS/HCC)    • Hypertension          Current Meds:     Current Outpatient Medications   Medication Sig Dispense Refill   • aspirin 325 MG tablet Take 325 mg by mouth Daily.     • ezetimibe (ZETIA) 10 MG tablet Take 10 mg by mouth Daily.     • gabapentin (NEURONTIN) 300 MG capsule take 1 capsule by mouth three times a day  0   • lisinopril-hydrochlorothiazide (PRINZIDE,ZESTORETIC) 20-12.5 MG per tablet Take 1 tablet by mouth 2 (Two) Times a Day.  0   • olmesartan (BENICAR) 20 MG tablet Take 40 mg by mouth Daily.     • RA COENZYME Q-10 100 MG capsule Take 100 mg by mouth Daily.  0   • rosuvastatin (CRESTOR) 10 MG tablet Take 10 mg by mouth Daily.  0   • tamsulosin (FLOMAX) 0.4 MG capsule 24 hr capsule   0   • traMADol (ULTRAM) 50 MG tablet take 1 tablet by mouth twice a day if needed  0     No current facility-administered medications for this visit.         Allergies:      Allergies   Allergen Reactions   • Arithmin [Antazoline] Hives   • Morphine And Related Hives        Past Surgical History:     Past Surgical History:   Procedure Laterality Date   • CARDIAC SURGERY     • CARPAL TUNNEL RELEASE WITH CUBITAL TUNNEL RELEASE Bilateral    • CYSTOSCOPY W/ URETEROSCOPY W/ LITHOTRIPSY           Social History:          Social History     Socioeconomic History   • Marital status: Unknown     Spouse name: Not on file   • Number of children: Not on file   • Years of education: Not on file   • Highest education level: Not on file   Tobacco Use   • Smoking status: Current Every Day Smoker     Types: Pipe   • Smokeless tobacco: Never Used   Substance and Sexual Activity   • Alcohol use: Yes   • Drug use: No       Family History:     Family History   Problem Relation Age of Onset   • Heart disease Father    • Stroke Father    • Heart disease Mother        Review of Systems:     Review of Systems   Constitutional: Negative.    HENT: Negative.    Eyes: Negative.    Respiratory: Negative.    Cardiovascular: Negative.    Gastrointestinal: Negative.    Endocrine: Negative.    Musculoskeletal: Negative.    Allergic/Immunologic: Negative.    Neurological: Negative.    Hematological: Negative.    Psychiatric/Behavioral: Negative.        Physical Exam:     Physical Exam  Vitals signs and nursing note reviewed.   Constitutional:       Appearance: He is well-developed.   HENT:      Head: Normocephalic and atraumatic.   Eyes:      Conjunctiva/sclera: Conjunctivae normal.      Pupils: Pupils are equal, round, and reactive to light.   Neck:      Musculoskeletal: Normal range of motion.   Cardiovascular:      Rate and Rhythm: Normal rate and regular rhythm.      Heart sounds: Normal heart sounds.   Pulmonary:      Effort: Pulmonary effort is normal.      Breath sounds: Normal breath sounds.   Abdominal:      General: Bowel sounds are normal.      Palpations: Abdomen is soft.   Genitourinary:     Comments: Greater than 80 g prostate to palpation good rectal tone  Musculoskeletal: Normal range of motion.   Skin:     General: Skin is warm and dry.   Neurological:      Mental Status: He is alert and oriented to person, place, and time.      Deep Tendon Reflexes: Reflexes are normal and symmetric.   Psychiatric:         Behavior: Behavior normal.          Thought Content: Thought content normal.         Judgment: Judgment normal.         I have reviewed the following portions of the patient's history: allergies, current medications, past family history, past medical history, past social history, past surgical history, problem list and ROS and confirm it's accurate.      Procedure:       Assessment/Plan:   BPH: Discussed the pathophysiology of BPH and obstruction.  We discussed the static and dynamic effect effects of BPH as well as using 5 alpha reductase inhibitors versus alpha blockade.  We discussed the indications for transurethral surgery as well.  And/ or other therapeutic options available including all of the newer techniques.  He has greater than 80 g prostate this is probably the most likely explanation for the elevated PSA  Elevated prostate specific antigen-we discussed the diagnosis of elevated prostate-specific antigen.  I explained the pathophysiology of PSA.  It is a serine protease that's function in the male reproductive tract is to facilitate the liquefaction of semen.  It is for this reason the body does not want it freely floating in the serum and why typically bound tightly to albumin.  We discussed why we used both a PSA free and total to determine the need for more aggressive therapy I discussed the normal range.  Additionally it was in the range of 1-4 but more recently has been downgraded to something less than 2 or even approaching 1.  I discussed the risk of family history particularly the fact that the average male has a 14% risk of prostate cancer and that in the face of a positive diagnosis in a father it will tablet and any other first-generation relative continued tablet insofar that a father and brother with prostate cancer will produce almost a 50% risk of prostate cancer.  I discussed the use of the temporal use of PSA as the best option for monitoring.  We also discussed the fact that an elevated PSA is an isolated event does not  mean that this is prostate cancer and should not engender worry in this regard. I discussed other things that can elevate PSA including constipation, prostatitis, infection, recent intercourse etc. as well as the risks and benefits associated with this.  Also discussed the fact that this is with a dilutional test and as a consequence of such were present produce slight variations on a single specimen.  Further discussed the risks and benefits of a prostate biopsy as well.  Renal calculus-we discussed the presence of the stone we discussed the various therapeutic options available including percutaneous nephrostolithotomy, lithotripsy.  We discussed the risks of lithotripsy including the passage of stones the development of a large string of stones in the distal ureter known as Steinstrasse.  In the 3% incidence of that we will need to proceed with a ureteroscopy for obstructing fragments.  Extremely rare incidence of renal hematoma.  And the significance of this.  We discussed the absolute relative indicators for intervention including the presence of sepsis, and pain we cannot control is the primary need for urgent intervention.  We discussed placement of a stent if indicated and the management of the stent as well.            Patient's Body mass index is 29.51 kg/m². BMI is above normal parameters. Recommendations include: educational material.              This document has been electronically signed by DEBORAH ROBERSON MD December 22, 2020 09:28 EST

## 2020-12-23 LAB
PSA FREE MFR SERPL: 70.4 %
PSA FREE SERPL-MCNC: 39 NG/ML
PSA SERPL-MCNC: 55.4 NG/ML (ref 0–4)

## 2021-01-14 ENCOUNTER — OFFICE VISIT (OUTPATIENT)
Dept: UROLOGY | Facility: CLINIC | Age: 71
End: 2021-01-14

## 2021-01-14 VITALS — TEMPERATURE: 97.2 F | HEIGHT: 67 IN | BODY MASS INDEX: 30.29 KG/M2 | WEIGHT: 193 LBS

## 2021-01-14 DIAGNOSIS — R97.20 ELEVATED PROSTATE SPECIFIC ANTIGEN (PSA): Primary | ICD-10-CM

## 2021-01-14 DIAGNOSIS — N20.0 RENAL CALCULUS: ICD-10-CM

## 2021-01-14 LAB — PSA SERPL-MCNC: 7.32 NG/ML (ref 0–4)

## 2021-01-14 PROCEDURE — 36415 COLL VENOUS BLD VENIPUNCTURE: CPT | Performed by: UROLOGY

## 2021-01-14 PROCEDURE — 99213 OFFICE O/P EST LOW 20 MIN: CPT | Performed by: UROLOGY

## 2021-01-14 PROCEDURE — 84153 ASSAY OF PSA TOTAL: CPT | Performed by: UROLOGY

## 2021-01-14 NOTE — PROGRESS NOTES
Chief Complaint:          Chief Complaint   Patient presents with   • Elevated PSA     1 mnth f/u       HPI:   70 y.o. male very well-known to me status post a percutaneous nephrostolithotomy at the [facility] had a long-term history of elevated PSA going back to 2018 it was 5.4, 4.8, in 2017 it was 7.1 with a free PSA of 23.2% most recently in December 2019 it was 6.99 he came in for his routine yearly check it was 55.4.  I am going to repeat it if it if that is not artifactual I will recommend an ultrasound biopsy he has absolutely pain and symptom-free      Past Medical History:        Past Medical History:   Diagnosis Date   • Heart attack (CMS/HCC)    • Hypertension          Current Meds:     Current Outpatient Medications   Medication Sig Dispense Refill   • aspirin 325 MG tablet Take 325 mg by mouth Daily.     • ezetimibe (ZETIA) 10 MG tablet Take 10 mg by mouth Daily.     • gabapentin (NEURONTIN) 300 MG capsule take 1 capsule by mouth three times a day  0   • lisinopril-hydrochlorothiazide (PRINZIDE,ZESTORETIC) 20-12.5 MG per tablet Take 1 tablet by mouth 2 (Two) Times a Day.  0   • olmesartan (BENICAR) 20 MG tablet Take 40 mg by mouth Daily.     • RA COENZYME Q-10 100 MG capsule Take 100 mg by mouth Daily.  0   • rosuvastatin (CRESTOR) 10 MG tablet Take 10 mg by mouth Daily.  0   • tamsulosin (FLOMAX) 0.4 MG capsule 24 hr capsule   0   • traMADol (ULTRAM) 50 MG tablet take 1 tablet by mouth twice a day if needed  0     No current facility-administered medications for this visit.         Allergies:      Allergies   Allergen Reactions   • Arithmin [Antazoline] Hives   • Morphine And Related Hives        Past Surgical History:     Past Surgical History:   Procedure Laterality Date   • CARDIAC SURGERY     • CARPAL TUNNEL RELEASE WITH CUBITAL TUNNEL RELEASE Bilateral    • CYSTOSCOPY W/ URETEROSCOPY W/ LITHOTRIPSY           Social History:     Social History     Socioeconomic History   • Marital status:  Unknown     Spouse name: Not on file   • Number of children: Not on file   • Years of education: Not on file   • Highest education level: Not on file   Tobacco Use   • Smoking status: Current Every Day Smoker     Types: Pipe   • Smokeless tobacco: Never Used   Substance and Sexual Activity   • Alcohol use: Yes   • Drug use: No   • Sexual activity: Defer       Family History:     Family History   Problem Relation Age of Onset   • Heart disease Father    • Stroke Father    • Heart disease Mother        Review of Systems:     Review of Systems   Constitutional: Negative.    HENT: Negative.    Eyes: Negative.    Respiratory: Negative.    Cardiovascular: Negative.    Gastrointestinal: Negative.    Endocrine: Negative.    Musculoskeletal: Negative.    Allergic/Immunologic: Negative.    Neurological: Negative.    Hematological: Negative.    Psychiatric/Behavioral: Negative.        Physical Exam:     Physical Exam  Vitals signs and nursing note reviewed.   Constitutional:       Appearance: He is well-developed.   HENT:      Head: Normocephalic and atraumatic.   Eyes:      Conjunctiva/sclera: Conjunctivae normal.      Pupils: Pupils are equal, round, and reactive to light.   Neck:      Musculoskeletal: Normal range of motion.   Cardiovascular:      Rate and Rhythm: Normal rate and regular rhythm.      Heart sounds: Normal heart sounds.   Pulmonary:      Effort: Pulmonary effort is normal.      Breath sounds: Normal breath sounds.   Abdominal:      General: Bowel sounds are normal.      Palpations: Abdomen is soft.   Musculoskeletal: Normal range of motion.   Skin:     General: Skin is warm and dry.   Neurological:      Mental Status: He is alert and oriented to person, place, and time.      Deep Tendon Reflexes: Reflexes are normal and symmetric.   Psychiatric:         Behavior: Behavior normal.         Thought Content: Thought content normal.         Judgment: Judgment normal.         I have reviewed the following portions  of the patient's history: allergies, current medications, past family history, past medical history, past social history, past surgical history, problem list and ROS and confirm it's accurate.      Procedure:       Assessment/Plan:   Elevated prostate specific antigen-we discussed the diagnosis of elevated prostate-specific antigen.  I explained the pathophysiology of PSA.  It is a serine protease that's function in the male reproductive tract is to facilitate the liquefaction of semen.  It is for this reason the body does not want it freely floating in the serum and why typically bound tightly to albumin.  We discussed why we used both a PSA free and total to determine the need for more aggressive therapy I discussed the normal range.  Additionally it was in the range of 1-4 but more recently has been downgraded to something less than 2 or even approaching 1.  I discussed the risk of family history particularly the fact that the average male has a 14% risk of prostate cancer and that in the face of a positive diagnosis in a father it will tablet and any other first-generation relative continued tablet insofar that a father and brother with prostate cancer will produce almost a 50% risk of prostate cancer.  I discussed the use of the temporal use of PSA as the best option for monitoring.  We also discussed the fact that an elevated PSA is an isolated event does not mean that this is prostate cancer and should not engender worry in this regard. I discussed other things that can elevate PSA including constipation, prostatitis, infection, recent intercourse etc. as well as the risks and benefits associated with this.  Also discussed the fact that this is with a dilutional test and as a consequence of such were present produce slight variations on a single specimen.  Further discussed the risks and benefits of a prostate biopsy as well.  There is a history of mild to moderately elevated PSA this was stable for several  years at least since 2017.  He has this 1 very high level been repeated today if it persists high he will need a biopsy.            Patient's Body mass index is 29.51 kg/m². BMI is above normal parameters. Recommendations include: educational material.              This document has been electronically signed by DEBORAH ROBERSON MD January 14, 2021 09:15 EST

## 2021-01-15 ENCOUNTER — TELEPHONE (OUTPATIENT)
Dept: UROLOGY | Facility: CLINIC | Age: 71
End: 2021-01-15

## 2021-01-15 NOTE — TELEPHONE ENCOUNTER
I called the patient and let him know his labs came back with fantastic redults and that we would need to recheck it in 6 mths

## 2021-02-19 ENCOUNTER — TELEPHONE (OUTPATIENT)
Dept: UROLOGY | Facility: CLINIC | Age: 71
End: 2021-02-19

## 2021-02-19 NOTE — TELEPHONE ENCOUNTER
I called the pt to let him know his PSA level was dramatically improved from the last time but could not reach anyone or leave a message.

## 2021-07-13 ENCOUNTER — LAB (OUTPATIENT)
Dept: UROLOGY | Facility: CLINIC | Age: 71
End: 2021-07-13

## 2021-07-13 DIAGNOSIS — R97.20 ELEVATED PROSTATE SPECIFIC ANTIGEN (PSA): Primary | ICD-10-CM

## 2021-07-13 PROCEDURE — 84153 ASSAY OF PSA TOTAL: CPT | Performed by: UROLOGY

## 2021-07-13 PROCEDURE — 36415 COLL VENOUS BLD VENIPUNCTURE: CPT | Performed by: UROLOGY

## 2021-07-13 PROCEDURE — 84154 ASSAY OF PSA FREE: CPT | Performed by: UROLOGY

## 2021-07-15 LAB
PSA FREE MFR SERPL: 29 %
PSA FREE SERPL-MCNC: 1.77 NG/ML
PSA SERPL-MCNC: 6.1 NG/ML (ref 0–4)

## 2021-12-21 ENCOUNTER — OFFICE VISIT (OUTPATIENT)
Dept: UROLOGY | Facility: CLINIC | Age: 71
End: 2021-12-21

## 2021-12-21 VITALS — BODY MASS INDEX: 30.29 KG/M2 | HEIGHT: 67 IN | WEIGHT: 193 LBS

## 2021-12-21 DIAGNOSIS — R97.20 ELEVATED PROSTATE SPECIFIC ANTIGEN (PSA): Primary | ICD-10-CM

## 2021-12-21 LAB — PSA SERPL-MCNC: 5.76 NG/ML (ref 0–4)

## 2021-12-21 PROCEDURE — 99213 OFFICE O/P EST LOW 20 MIN: CPT | Performed by: UROLOGY

## 2021-12-21 PROCEDURE — 84153 ASSAY OF PSA TOTAL: CPT | Performed by: UROLOGY

## 2021-12-29 NOTE — PROGRESS NOTES
Chief Complaint:          Chief Complaint   Patient presents with   • Elevated PSA     yearly follow up        HPI:   71 y.o. male turns today history of BPH here for yearly follow-up reports no complaints.  He reports no lower urinary tract symptomatology, particularly irritative symptoms such as frequency, urgency, dysuria, and obstructive symptomatology, particularly dribbling, hesitancy, and intermittency.  He is on alpha blockade.      Past Medical History:        Past Medical History:   Diagnosis Date   • Heart attack (HCC)    • Hypertension          Current Meds:     Current Outpatient Medications   Medication Sig Dispense Refill   • aspirin 325 MG tablet Take 325 mg by mouth Daily.     • ezetimibe (ZETIA) 10 MG tablet Take 10 mg by mouth Daily.     • gabapentin (NEURONTIN) 300 MG capsule take 1 capsule by mouth three times a day  0   • lisinopril-hydrochlorothiazide (PRINZIDE,ZESTORETIC) 20-12.5 MG per tablet Take 1 tablet by mouth 2 (Two) Times a Day.  0   • olmesartan (BENICAR) 20 MG tablet Take 40 mg by mouth Daily.     • RA COENZYME Q-10 100 MG capsule Take 100 mg by mouth Daily.  0   • rosuvastatin (CRESTOR) 10 MG tablet Take 10 mg by mouth Daily.  0   • tamsulosin (FLOMAX) 0.4 MG capsule 24 hr capsule   0   • traMADol (ULTRAM) 50 MG tablet take 1 tablet by mouth twice a day if needed  0     No current facility-administered medications for this visit.        Allergies:      Allergies   Allergen Reactions   • Arithmin [Antazoline] Hives   • Morphine And Related Hives        Past Surgical History:     Past Surgical History:   Procedure Laterality Date   • CARDIAC SURGERY     • CARPAL TUNNEL RELEASE WITH CUBITAL TUNNEL RELEASE Bilateral    • CYSTOSCOPY W/ URETEROSCOPY W/ LITHOTRIPSY           Social History:     Social History     Socioeconomic History   • Marital status: Unknown   Tobacco Use   • Smoking status: Current Every Day Smoker     Types: Pipe   • Smokeless tobacco: Never Used   Vaping Use   •  Vaping Use: Never used   Substance and Sexual Activity   • Alcohol use: Yes   • Drug use: No   • Sexual activity: Defer       Family History:     Family History   Problem Relation Age of Onset   • Heart disease Father    • Stroke Father    • Heart disease Mother        Review of Systems:     Review of Systems   Constitutional: Negative.    HENT: Negative.    Eyes: Negative.    Respiratory: Negative.    Cardiovascular: Negative.    Gastrointestinal: Negative.    Endocrine: Negative.    Musculoskeletal: Negative.    Allergic/Immunologic: Negative.    Neurological: Negative.    Hematological: Negative.    Psychiatric/Behavioral: Negative.        Physical Exam:     Physical Exam  Vitals and nursing note reviewed.   Constitutional:       Appearance: He is well-developed.   HENT:      Head: Normocephalic and atraumatic.   Eyes:      Conjunctiva/sclera: Conjunctivae normal.      Pupils: Pupils are equal, round, and reactive to light.   Cardiovascular:      Rate and Rhythm: Normal rate and regular rhythm.      Heart sounds: Normal heart sounds.   Pulmonary:      Effort: Pulmonary effort is normal.      Breath sounds: Normal breath sounds.   Abdominal:      General: Bowel sounds are normal.      Palpations: Abdomen is soft.   Musculoskeletal:         General: Normal range of motion.      Cervical back: Normal range of motion.   Skin:     General: Skin is warm and dry.   Neurological:      Mental Status: He is alert and oriented to person, place, and time.      Deep Tendon Reflexes: Reflexes are normal and symmetric.   Psychiatric:         Behavior: Behavior normal.         Thought Content: Thought content normal.         Judgment: Judgment normal.         I have reviewed the following portions of the patient's history: Allergies, current medications, past family history, past medical history, past social history, past surgical history, problem list, and ROS and confirm it is accurate.      Procedure:       Assessment/Plan:    PSA testing-I am recommending a PSA blood test that stands for prostate specific antigen.  I discussed the pathophysiology of PSA testing indicating its use in the diagnosis and management of prostate cancer.  I discussed the normal range being 0 to 4, but more appropriately being much closer to 0 to 2 in a normal male.  I discussed the fact that after a certain age we don't recommend PSA testing especially in view of numerous comorbidities, that this will not be a useful test.  I discussed many of the things that can artificially raise PSA including a recent infection, urinary tract infection, and recent sexual intercourse, or even the type of movement such as manipulation of the prostate from riding a bicycle.  After all this is taken into account when the test is reviewed, the most important use of PSA is the velocity measurement.  In other words, the change of PSA with time is a very important factor in the use and that we look for greater than 20% rise over a year to help us make the prediction of prostate cancer.  I also discussed that the use with prostate cancer indicating that after a radical prostatectomy, the PSA should be 0 and any rise indicates an early biochemical recurrence.  BPH: Discussed the pathophysiology of BPH and obstruction.  We discussed the static and dynamic effects of BPH as well as using 5 alpha reductase inhibitors versus alpha blockade.  We discussed the indications for transurethral surgery as well and/ or other therapeutic options available including all of the newer techniques.  Continue alpha blockade                  This document has been electronically signed by DEBORAH ROBERSON MD December 29, 2021 07:58 EST

## 2022-12-20 ENCOUNTER — OFFICE VISIT (OUTPATIENT)
Dept: UROLOGY | Facility: CLINIC | Age: 72
End: 2022-12-20
Payer: MEDICARE

## 2022-12-20 VITALS
DIASTOLIC BLOOD PRESSURE: 72 MMHG | HEART RATE: 61 BPM | HEIGHT: 67 IN | SYSTOLIC BLOOD PRESSURE: 148 MMHG | WEIGHT: 193 LBS | BODY MASS INDEX: 30.29 KG/M2

## 2022-12-20 DIAGNOSIS — N40.0 BENIGN PROSTATIC HYPERPLASIA WITHOUT LOWER URINARY TRACT SYMPTOMS: ICD-10-CM

## 2022-12-20 DIAGNOSIS — R97.20 ELEVATED PROSTATE SPECIFIC ANTIGEN (PSA): Primary | ICD-10-CM

## 2022-12-20 LAB — PSA SERPL-MCNC: 7.19 NG/ML (ref 0–4)

## 2022-12-20 PROCEDURE — 99213 OFFICE O/P EST LOW 20 MIN: CPT | Performed by: UROLOGY

## 2022-12-20 PROCEDURE — 36415 COLL VENOUS BLD VENIPUNCTURE: CPT | Performed by: UROLOGY

## 2022-12-20 PROCEDURE — 84153 ASSAY OF PSA TOTAL: CPT | Performed by: UROLOGY

## 2022-12-20 RX ORDER — AMIODARONE HYDROCHLORIDE 100 MG/1
TABLET ORAL
COMMUNITY
Start: 2022-09-20

## 2022-12-20 NOTE — PROGRESS NOTES
Chief Complaint:      Chief Complaint   Patient presents with   • Elevated PSA       HPI:   72 y.o. male who is here for his yearly follow-up of elevated PSA on December 21, 2021 was 5.76.  He reports no lower urinary tract symptomatology, particularly irritative symptoms such as frequency, urgency, dysuria, and obstructive symptomatology, particularly dribbling, hesitancy, and intermittency.  He has atrial fibrillation on blood thinner he had recent bright red blood per rectum and was recommended to see his GI doctor.  I have a PSA pending.  I did not give refills.    Past Medical History:     Past Medical History:   Diagnosis Date   • Heart attack (HCC)    • Hypertension        Current Meds:     Current Outpatient Medications   Medication Sig Dispense Refill   • amiodarone (PACERONE) 100 MG tablet TAKE 1 TABLET BY MOUTH EVERY MORNING. HOLD IF HEART RATE IS BELOW 60 BEATS PER MIMUTE     • apixaban (ELIQUIS) 5 MG tablet tablet Take 5 mg by mouth.     • aspirin 325 MG tablet Take 325 mg by mouth Daily.     • ezetimibe (ZETIA) 10 MG tablet Take 10 mg by mouth Daily.     • gabapentin (NEURONTIN) 300 MG capsule take 1 capsule by mouth three times a day  0   • lisinopril-hydrochlorothiazide (PRINZIDE,ZESTORETIC) 20-12.5 MG per tablet Take 1 tablet by mouth 2 (Two) Times a Day.  0   • olmesartan (BENICAR) 20 MG tablet Take 40 mg by mouth Daily.     • RA COENZYME Q-10 100 MG capsule Take 100 mg by mouth Daily.  0   • rosuvastatin (CRESTOR) 10 MG tablet Take 10 mg by mouth Daily.  0   • tamsulosin (FLOMAX) 0.4 MG capsule 24 hr capsule   0   • traMADol (ULTRAM) 50 MG tablet take 1 tablet by mouth twice a day if needed  0     No current facility-administered medications for this visit.        Allergies:      Allergies   Allergen Reactions   • Arithmin [Antazoline] Hives   • Morphine And Related Hives        Past Surgical History:     Past Surgical History:   Procedure Laterality Date   • CARDIAC SURGERY     • CARPAL TUNNEL  RELEASE WITH CUBITAL TUNNEL RELEASE Bilateral    • CYSTOSCOPY W/ URETEROSCOPY W/ LITHOTRIPSY         Social History:     Social History     Socioeconomic History   • Marital status: Unknown   Tobacco Use   • Smoking status: Every Day     Types: Pipe   • Smokeless tobacco: Never   Vaping Use   • Vaping Use: Never used   Substance and Sexual Activity   • Alcohol use: Yes   • Drug use: No   • Sexual activity: Defer       Family History:     Family History   Problem Relation Age of Onset   • Heart disease Father    • Stroke Father    • Heart disease Mother        Review of Systems:     Review of Systems   Constitutional: Negative.  Negative for chills, fatigue and fever.   HENT: Negative.    Eyes: Negative.    Respiratory: Negative.  Negative for cough, shortness of breath and wheezing.    Cardiovascular: Negative.  Negative for leg swelling.   Gastrointestinal: Negative.  Negative for abdominal pain, nausea and vomiting.   Endocrine: Negative.    Genitourinary: Negative for difficulty urinating, dysuria, genital sores, penile pain and testicular pain.   Musculoskeletal: Negative.  Negative for back pain and joint swelling.   Allergic/Immunologic: Negative.    Neurological: Negative.  Negative for dizziness, facial asymmetry and headaches.   Hematological: Negative.    Psychiatric/Behavioral: Negative.  Negative for confusion.       Physical Exam:     Physical Exam  Vitals and nursing note reviewed.   Constitutional:       Appearance: He is well-developed.   HENT:      Head: Normocephalic and atraumatic.   Eyes:      Conjunctiva/sclera: Conjunctivae normal.      Pupils: Pupils are equal, round, and reactive to light.   Cardiovascular:      Rate and Rhythm: Normal rate and regular rhythm.      Heart sounds: Normal heart sounds.   Pulmonary:      Effort: Pulmonary effort is normal.      Breath sounds: Normal breath sounds.   Abdominal:      General: Bowel sounds are normal.      Palpations: Abdomen is soft.    Musculoskeletal:         General: Normal range of motion.      Cervical back: Normal range of motion.   Skin:     General: Skin is warm and dry.   Neurological:      Mental Status: He is alert and oriented to person, place, and time.      Deep Tendon Reflexes: Reflexes are normal and symmetric.   Psychiatric:         Behavior: Behavior normal.         Thought Content: Thought content normal.         Judgment: Judgment normal.         I have reviewed the following portions of the patient's history: Allergies, current medications, past family history, past medical history, past social history, past surgical history, problem list, and ROS and confirm it is accurate.    Recent Image (CT and/or KUB):      CT Abdomen and Pelvis: No results found for this or any previous visit.       CT Stone Protocol: No results found for this or any previous visit.       KUB: No results found for this or any previous visit.       Labs (past 3 months):      No visits with results within 3 Month(s) from this visit.   Latest known visit with results is:   Office Visit on 12/21/2021   Component Date Value Ref Range Status   • PSA 12/21/2021 5.760 (H)  0.000 - 4.000 ng/mL Final        Procedure:       Assessment/Plan:   BPH: Discussed the pathophysiology of BPH and obstruction.  We discussed the static and dynamic effects of BPH as well as using 5 alpha reductase inhibitors versus alpha blockade.  We discussed the indications for transurethral surgery as well and/ or other therapeutic options available including all of the newer techniques.  Currently on no medication no symptomatology noted.  PSA testing-I am recommending a PSA blood test that stands for prostate specific antigen.  I discussed the pathophysiology of PSA testing indicating its use in the diagnosis and management of prostate cancer.  I discussed the normal range being 0 to 4, but more appropriately being much closer to 0 to 2 in a normal male.  I discussed the fact that  after a certain age we don't recommend PSA testing especially in view of numerous comorbidities, that this will not be a useful test.  I discussed many of the things that can artificially raise PSA including a recent infection, urinary tract infection, and recent sexual intercourse, or even the type of movement such as manipulation of the prostate from riding a bicycle.  After all this is taken into account when the test is reviewed, the most important use of PSA is the velocity measurement.  In other words, the change of PSA with time is a very important factor in the use and that we look for greater than 20% rise over a year to help us make the prediction of prostate cancer.  I also discussed that the use with prostate cancer indicating that after a radical prostatectomy, the PSA should be 0 and any rise indicates an early biochemical recurrence.              This document has been electronically signed by DEBORAH ROBERSON MD December 20, 2022 09:56 EST    Dictated Utilizing Dragon Dictation: Part of this note may be an electronic transcription/translation of spoken language to printed text using the Dragon Dictation System.

## 2023-01-07 PROBLEM — N40.0 BENIGN PROSTATIC HYPERPLASIA WITHOUT LOWER URINARY TRACT SYMPTOMS: Status: ACTIVE | Noted: 2023-01-07

## 2023-12-20 ENCOUNTER — OFFICE VISIT (OUTPATIENT)
Dept: UROLOGY | Facility: CLINIC | Age: 73
End: 2023-12-20
Payer: MEDICARE

## 2023-12-20 VITALS
DIASTOLIC BLOOD PRESSURE: 75 MMHG | BODY MASS INDEX: 26.78 KG/M2 | SYSTOLIC BLOOD PRESSURE: 149 MMHG | HEART RATE: 60 BPM | WEIGHT: 170.6 LBS | HEIGHT: 67 IN

## 2023-12-20 DIAGNOSIS — R97.20 ELEVATED PROSTATE SPECIFIC ANTIGEN (PSA): ICD-10-CM

## 2023-12-20 DIAGNOSIS — N40.0 BENIGN PROSTATIC HYPERPLASIA WITHOUT LOWER URINARY TRACT SYMPTOMS: Primary | ICD-10-CM

## 2023-12-20 LAB — PSA SERPL-MCNC: 9.96 NG/ML (ref 0–4)

## 2023-12-20 PROCEDURE — 84153 ASSAY OF PSA TOTAL: CPT

## 2023-12-20 NOTE — PROGRESS NOTES
"Chief Complaint:    Chief Complaint   Patient presents with    Elevated PSA    Benign Prostatic Hypertrophy     Yearly fu        Vital Signs:   /75   Pulse 60   Ht 170.2 cm (67.01\")   Wt 77.4 kg (170 lb 9.6 oz)   BMI 26.71 kg/m²   Body mass index is 26.71 kg/m².      HPI:  Felipe Padilla is a 73 y.o. male who presents today for follow up    History of Present Illness  Mr. Padilla presents to the clinic for follow-up for elevated PSA and BPH.  He also has a past medical history of staghorn calculus for which he underwent a percutaneous nephrolithotomy and in 2018 at [facility].  He had an elevated PSA over the past 5 years.  In 2020 it did jump as high as 55 but decreased back down to 7.3 roughly a month later.  His PSA has ranged from 6.1, 5.7, and his most recent PSA in December 2022 was 7.19 patient denies any acute problems since last office visit.  States he gets up roughly 2 times throughout the night but reports if he misses his dosage of lisinopril/hydrochlorothiazide he does not get up at all.  He does have some slight intermittency and weak stream however denies any urgency, frequency, sensation of incomplete bladder emptying, pressure and strain to begin with urination.  He has tried Flomax in the past with no significant benefit.  He is pleased with symptoms at this time.      Past Medical History:  Past Medical History:   Diagnosis Date    Heart attack     Hypertension        Current Meds:  Current Outpatient Medications   Medication Sig Dispense Refill    amiodarone (PACERONE) 100 MG tablet TAKE 1 TABLET BY MOUTH EVERY MORNING. HOLD IF HEART RATE IS BELOW 60 BEATS PER MIMUTE      aspirin 325 MG tablet Take 1 tablet by mouth Daily.      ezetimibe (ZETIA) 10 MG tablet Take 1 tablet by mouth Daily.      lisinopril-hydrochlorothiazide (PRINZIDE,ZESTORETIC) 20-12.5 MG per tablet Take 1 tablet by mouth 2 (Two) Times a Day.  0    RA COENZYME Q-10 100 MG capsule Take 1 capsule by mouth " Daily.  0     No current facility-administered medications for this visit.        Allergies:   Allergies   Allergen Reactions    Arithmin [Antazoline] Hives    Morphine And Related Hives        Past Surgical History:  Past Surgical History:   Procedure Laterality Date    CARDIAC SURGERY      CARPAL TUNNEL RELEASE WITH CUBITAL TUNNEL RELEASE Bilateral     CYSTOSCOPY W/ URETEROSCOPY W/ LITHOTRIPSY         Social History:  Social History     Socioeconomic History    Marital status: Unknown   Tobacco Use    Smoking status: Every Day     Types: Pipe    Smokeless tobacco: Never   Vaping Use    Vaping Use: Never used   Substance and Sexual Activity    Alcohol use: Yes    Drug use: No    Sexual activity: Defer       Family History:  Family History   Problem Relation Age of Onset    Heart disease Father     Stroke Father     Heart disease Mother        Review of Systems:  Review of Systems   Constitutional:  Negative for chills, fatigue, fever and unexpected weight change.   HENT:  Negative for congestion and sinus pressure.    Respiratory:  Negative for chest tightness and shortness of breath.    Cardiovascular:  Negative for chest pain.   Gastrointestinal:  Negative for abdominal pain, constipation, diarrhea, nausea and vomiting.   Genitourinary:  Positive for frequency. Negative for difficulty urinating, dysuria, flank pain, hematuria and urgency.   Musculoskeletal:  Positive for back pain. Negative for neck pain.   Skin:  Negative for rash.   Allergic/Immunologic: Negative for food allergies.   Neurological:  Negative for dizziness and headaches.   Hematological:  Does not bruise/bleed easily.   Psychiatric/Behavioral:  Negative for confusion and suicidal ideas. The patient is nervous/anxious.        Physical Exam:  Physical Exam  Constitutional:       General: He is not in acute distress.     Appearance: Normal appearance.   HENT:      Head: Normocephalic and atraumatic.      Nose: Nose normal.      Mouth/Throat:       Mouth: Mucous membranes are moist.   Eyes:      Conjunctiva/sclera: Conjunctivae normal.   Cardiovascular:      Rate and Rhythm: Normal rate and regular rhythm.      Pulses: Normal pulses.      Heart sounds: Normal heart sounds.   Pulmonary:      Effort: Pulmonary effort is normal.      Breath sounds: Normal breath sounds.   Abdominal:      General: Bowel sounds are normal.      Palpations: Abdomen is soft.   Musculoskeletal:         General: Normal range of motion.      Cervical back: Normal range of motion.   Skin:     General: Skin is warm.   Neurological:      General: No focal deficit present.      Mental Status: He is alert and oriented to person, place, and time.   Psychiatric:         Mood and Affect: Mood normal.         Behavior: Behavior normal.         Thought Content: Thought content normal.         Judgment: Judgment normal.         IPSS Questionnaire (AUA-7):  IPSS Questionnaire (AUA-7):                  IPSS Questionnaire (AUA-7):  Over the past month…    1)  Incomplete Emptying  How often have you had a sensation of not emptying your bladder?  0 - Not at all   2)  Frequency  How often have you had to urinate less than every two hours? 0 - Not at all   3)  Intermittency  How often have you found you stopped and started again several times when you urinated?  1 - Less than 1 time in 5   4) Urgency  How often have you found it difficult to postpone urination?  0 - Not at all   5) Weak Stream  How often have you had a weak urinary stream?  2 - Less than half the time   6) Straining  How often have you had to push or strain to begin urination?  0 - Not at all   7) Nocturia  How many times did you typically get up at night to urinate?  2 - 2 times   Total Score:  5   The International Prostate Symptom Score (IPSS) is used to screen, diagnose, track symptoms of benign prostatic hyperplasia (BPH).    0-7 pts (Mild Symptoms)  / 8-19 pts (Moderate) / 20-35 (Severe)    Quality of life due to urinary  symptoms:  If you were to spend the rest of your life with your urinary condition the way it is now, how would you feel about that? 2-Mostly Satisfied   Urine Leakage (Incontinence) 0-No Leakage       Recent Image (CT and/or KUB):   CT Abdomen and Pelvis: No results found for this or any previous visit.     CT Stone Protocol: No results found for this or any previous visit.     KUB: No results found for this or any previous visit.       Labs:  Brief Urine Lab Results       None          No visits with results within 3 Month(s) from this visit.   Latest known visit with results is:   Office Visit on 12/20/2022   Component Date Value Ref Range Status    PSA 12/20/2022 7.190 (H)  0.000 - 4.000 ng/mL Final        Procedure: None  Procedures     I have reviewed and agree with the above PMH, PSH, FMH, social history, medications, allergies, and labs.     Assessment/Plan:   Problem List Items Addressed This Visit          Genitourinary and Reproductive     Elevated prostate specific antigen (PSA)    Relevant Orders    PSA Diagnostic    Benign prostatic hyperplasia without lower urinary tract symptoms - Primary    Relevant Orders    PSA Diagnostic       Health Maintenance:   Health Maintenance Due   Topic Date Due    URINE MICROALBUMIN  Never done    COLORECTAL CANCER SCREENING  Never done    Pneumococcal Vaccine 65+ (1 - PCV) Never done    ZOSTER VACCINE (1 of 2) Never done    HEPATITIS C SCREENING  Never done    ANNUAL WELLNESS VISIT  Never done    DIABETIC FOOT EXAM  Never done    DIABETIC EYE EXAM  Never done    BMI FOLLOWUP  01/14/2022    TDAP/TD VACCINES (2 - Td or Tdap) 07/31/2022    INFLUENZA VACCINE  Never done    COVID-19 Vaccine (3 - 2023-24 season) 09/01/2023        Smoking Counseling: Everyday smoker.  Never used smokeless tobacco.  Counseling given however not ready to quit at this time.    Urine Incontinence: Patient reports that he is not currently experiencing any symptoms of urinary  incontinence.    Patient was given instructions and counseling regarding his condition or for health maintenance advice. Please see specific information pulled into the AVS if appropriate.    Patient Education:   Benign Prostate Hypertrophy (BPH): Discussed the pathophysiology of BPH and obstruction.  We discussed the static and dynamic effects of BPH as well as using 5 alpha reductase inhibitors versus alpha blockade.  We discussed the indications for transurethral surgery as well and/ or other therapeutic options available including all of the newer techniques.  Patient has very minimal lower urinary tract symptoms at this time Jean Paulidedilia not recommending any medications.  I did discuss the use of 5 alpha reductase inhibitors to help with elevated PSA.  Discussed the risk and benefits of this with the patient.  Will hold off on 5 alpha reductase until patient's PSA is completed.  PSA testing - I am recommending a prostate specific antigen blood test or PSA.  I discussed the pathophysiology of PSA testing indicating its use in the diagnosis and management of prostate cancer.  I discussed the normal range being 0 to 4, but more appropriately being much closer to 0 to 2 in a normal male.  I discussed the fact that after a certain age it is against recommendation to use PSA testing especially in view of numerous comorbidities.  I discussed many of the things that can artificially raise PSA including put not limited to a recent infection, urinary tract infection, recent sexual intercourse, or even the type of movement such as manipulation of the prostate from riding a bicycle.  It was discussed that the most important use of PSA is the velocity measurement.  This refers to the change of PSA with time. I discussed that we look for greater than 20% rise over a year to help us make the prediction of prostate cancer.  I also discussed that in the case of prostate cancer indicating a radical prostatectomy, the PSA should be 0  and any rise indicates an early biochemical recurrence.  Did discuss with the patient given patient's slight increase in PSA over 1 year ago can start with either an MRI of the prostate or finasteride.  I will call patient with PSA results once obtained.  Family can discuss further care at that point.  Otherwise patient can follow-up in 1 year for repeat PSA.    Visit Diagnoses:    ICD-10-CM ICD-9-CM   1. Benign prostatic hyperplasia without lower urinary tract symptoms  N40.0 600.00   2. Elevated prostate specific antigen (PSA)  R97.20 790.93       Meds Ordered During Visit:  No orders of the defined types were placed in this encounter.      Follow Up Appointment: 1 year  No follow-ups on file.      This document has been electronically signed by Augusto Huynh PA-C   December 20, 2023 10:23 EST    Part of this note may be an electronic transcription/translation of spoken language to printed text using the Dragon Dictation System.

## 2023-12-21 ENCOUNTER — TELEPHONE (OUTPATIENT)
Dept: UROLOGY | Facility: CLINIC | Age: 73
End: 2023-12-21
Payer: MEDICARE

## 2023-12-21 DIAGNOSIS — N40.0 BENIGN PROSTATIC HYPERPLASIA WITHOUT LOWER URINARY TRACT SYMPTOMS: ICD-10-CM

## 2023-12-21 DIAGNOSIS — R97.20 ELEVATED PROSTATE SPECIFIC ANTIGEN (PSA): Primary | ICD-10-CM

## 2023-12-21 RX ORDER — FINASTERIDE 5 MG/1
5 TABLET, FILM COATED ORAL DAILY
Qty: 30 TABLET | Refills: 5 | Status: SHIPPED | OUTPATIENT
Start: 2023-12-21

## 2023-12-21 NOTE — TELEPHONE ENCOUNTER
Called patient to discuss PSA results.  Advised his PSA did increase from 7.7-9.9.  Did discuss with him the use of an MRI versus medications such as 5 of reductase inhibitors.  He wishes start finasteride at this time.  Advised patient to come back in in roughly 3 to 4 months for repeat PSA.  Patient verbalized understanding.

## 2024-03-13 ENCOUNTER — LAB (OUTPATIENT)
Dept: UROLOGY | Facility: CLINIC | Age: 74
End: 2024-03-13
Payer: MEDICARE

## 2024-03-13 DIAGNOSIS — R97.20 ELEVATED PROSTATE SPECIFIC ANTIGEN (PSA): Primary | ICD-10-CM

## 2024-03-13 PROCEDURE — 84153 ASSAY OF PSA TOTAL: CPT

## 2024-03-14 ENCOUNTER — TELEPHONE (OUTPATIENT)
Dept: UROLOGY | Facility: CLINIC | Age: 74
End: 2024-03-14
Payer: MEDICARE

## 2024-03-14 LAB — PSA SERPL-MCNC: 6.76 NG/ML (ref 0–4)

## 2024-03-14 NOTE — TELEPHONE ENCOUNTER
Called patient to discuss PSA results.  Patient did not answer so left a voicemail.  Advised him PSA did drop from roughly 9.9-6.7.  Educated patient to continue with finasteride once daily and keep follow-up appointment in December.  Advised to return to clinic sooner if needed.

## 2024-07-10 DIAGNOSIS — R97.20 ELEVATED PROSTATE SPECIFIC ANTIGEN (PSA): ICD-10-CM

## 2024-07-10 DIAGNOSIS — N40.0 BENIGN PROSTATIC HYPERPLASIA WITHOUT LOWER URINARY TRACT SYMPTOMS: ICD-10-CM

## 2024-07-10 RX ORDER — FINASTERIDE 5 MG/1
5 TABLET, FILM COATED ORAL DAILY
Qty: 30 TABLET | Refills: 5 | Status: SHIPPED | OUTPATIENT
Start: 2024-07-10

## 2024-12-20 ENCOUNTER — OFFICE VISIT (OUTPATIENT)
Dept: UROLOGY | Facility: CLINIC | Age: 74
End: 2024-12-20
Payer: MEDICARE

## 2024-12-20 VITALS
BODY MASS INDEX: 27.31 KG/M2 | SYSTOLIC BLOOD PRESSURE: 162 MMHG | DIASTOLIC BLOOD PRESSURE: 72 MMHG | HEIGHT: 67 IN | HEART RATE: 61 BPM | WEIGHT: 174 LBS

## 2024-12-20 DIAGNOSIS — N40.0 BENIGN PROSTATIC HYPERPLASIA WITHOUT LOWER URINARY TRACT SYMPTOMS: ICD-10-CM

## 2024-12-20 DIAGNOSIS — R97.20 ELEVATED PROSTATE SPECIFIC ANTIGEN (PSA): Primary | ICD-10-CM

## 2024-12-20 LAB — PSA SERPL-MCNC: 3.08 NG/ML (ref 0–4)

## 2024-12-20 PROCEDURE — 84153 ASSAY OF PSA TOTAL: CPT

## 2024-12-20 RX ORDER — FINASTERIDE 5 MG/1
5 TABLET, FILM COATED ORAL DAILY
Qty: 90 TABLET | Refills: 3 | Status: SHIPPED | OUTPATIENT
Start: 2024-12-20

## 2024-12-20 NOTE — PROGRESS NOTES
"Chief Complaint:    Chief Complaint   Patient presents with    Benign Prostatic Hypertrophy    Elevated PSA     Yearly        Vital Signs:   /72 (BP Location: Left arm, Patient Position: Sitting)   Pulse 61   Ht 170.2 cm (67.01\")   Wt 78.9 kg (174 lb)   BMI 27.25 kg/m²   Body mass index is 27.25 kg/m².      HPI:  Felipe Padilla is a 74 y.o. male who presents today for follow up    History of Present Illness  Mr. Padilla presents to the clinic for follow-up for elevated PSA and BPH.  He also has a past medical history of staghorn calculus for which he underwent a percutaneous nephrolithotomy in 2018 at [facility].  He had an elevated PSA over the past 5 years.  In 2020 it did jump as high as 55 but decreased back down to 7.3 roughly a month later.  His PSA has ranged from 6.1, 5.7, 7.19, and 9.9 in December 2023.  He did have a repeat PSA in March of this year that had decreased to 6.7.  He has been on finasteride 5 mg once nightly with improvement in lower urinary tract symptoms.  He does endorse some intermittent back pain but denies any acute complications at this time.  He gets up 2-3 times throughout the night but is on a water pill and contributes his lower urinary tract symptoms to this.  Otherwise he has a mild IPSS score of 5.  He is pleased with medications.  Will repeat a PSA in office today.      Past Medical History:  Past Medical History:   Diagnosis Date    Heart attack     Hypertension        Current Meds:  Current Outpatient Medications   Medication Sig Dispense Refill    amiodarone (PACERONE) 100 MG tablet TAKE 1 TABLET BY MOUTH EVERY MORNING. HOLD IF HEART RATE IS BELOW 60 BEATS PER MIMUTE      aspirin 325 MG tablet Take 1 tablet by mouth Daily.      ezetimibe (ZETIA) 10 MG tablet Take 1 tablet by mouth Daily.      finasteride (PROSCAR) 5 MG tablet Take 1 tablet by mouth Daily. 90 tablet 3    lisinopril-hydrochlorothiazide (PRINZIDE,ZESTORETIC) 20-12.5 MG per tablet Take 1 " tablet by mouth 2 (Two) Times a Day.  0    RA COENZYME Q-10 100 MG capsule Take 1 capsule by mouth Daily.  0     No current facility-administered medications for this visit.        Allergies:   Allergies   Allergen Reactions    Arithmin [Antazoline] Hives    Morphine And Codeine Hives        Past Surgical History:  Past Surgical History:   Procedure Laterality Date    CARDIAC SURGERY      CARPAL TUNNEL RELEASE WITH CUBITAL TUNNEL RELEASE Bilateral     CYSTOSCOPY W/ URETEROSCOPY W/ LITHOTRIPSY         Social History:  Social History     Socioeconomic History    Marital status:    Tobacco Use    Smoking status: Every Day     Types: Pipe    Smokeless tobacco: Never   Vaping Use    Vaping status: Never Used   Substance and Sexual Activity    Alcohol use: Yes    Drug use: No    Sexual activity: Defer       Family History:  Family History   Problem Relation Age of Onset    Heart disease Father     Stroke Father     Heart disease Mother        Review of Systems:  Review of Systems   Constitutional:  Negative for chills, fatigue, fever and unexpected weight change.   HENT:  Negative for congestion and sinus pressure.    Respiratory:  Negative for chest tightness and shortness of breath.    Cardiovascular:  Negative for chest pain.   Gastrointestinal:  Negative for abdominal pain, constipation, diarrhea, nausea and vomiting.   Genitourinary:  Positive for frequency. Negative for difficulty urinating, dysuria, flank pain, hematuria and urgency.   Musculoskeletal:  Positive for back pain. Negative for neck pain.   Skin:  Negative for rash.   Allergic/Immunologic: Negative for food allergies.   Neurological:  Negative for dizziness and headaches.   Hematological:  Does not bruise/bleed easily.   Psychiatric/Behavioral:  Negative for confusion and suicidal ideas. The patient is nervous/anxious.        Physical Exam:  Physical Exam  Constitutional:       General: He is not in acute distress.     Appearance: Normal  appearance.   HENT:      Head: Normocephalic and atraumatic.      Nose: Nose normal.      Mouth/Throat:      Mouth: Mucous membranes are moist.   Eyes:      Conjunctiva/sclera: Conjunctivae normal.   Cardiovascular:      Rate and Rhythm: Normal rate and regular rhythm.      Pulses: Normal pulses.      Heart sounds: Normal heart sounds.   Pulmonary:      Effort: Pulmonary effort is normal.      Breath sounds: Normal breath sounds.   Abdominal:      General: Bowel sounds are normal.      Palpations: Abdomen is soft.   Musculoskeletal:         General: Normal range of motion.      Cervical back: Normal range of motion.   Skin:     General: Skin is warm.   Neurological:      General: No focal deficit present.      Mental Status: He is alert and oriented to person, place, and time.   Psychiatric:         Mood and Affect: Mood normal.         Behavior: Behavior normal.         Thought Content: Thought content normal.         Judgment: Judgment normal.         IPSS Questionnaire (AUA-7):  IPSS Questionnaire (AUA-7):                  IPSS Questionnaire (AUA-7):  Over the past month…    1)  Incomplete Emptying  How often have you had a sensation of not emptying your bladder?  0 - Not at all   2)  Frequency  How often have you had to urinate less than every two hours? 0 - Not at all   3)  Intermittency  How often have you found you stopped and started again several times when you urinated?  1 - Less than 1 time in 5   4) Urgency  How often have you found it difficult to postpone urination?  0 - Not at all   5) Weak Stream  How often have you had a weak urinary stream?  2 - Less than half the time   6) Straining  How often have you had to push or strain to begin urination?  0 - Not at all   7) Nocturia  How many times did you typically get up at night to urinate?  2 - 2 times   Total Score:  5   The International Prostate Symptom Score (IPSS) is used to screen, diagnose, track symptoms of benign prostatic hyperplasia  (BPH).    0-7 pts (Mild Symptoms)  / 8-19 pts (Moderate) / 20-35 (Severe)    Quality of life due to urinary symptoms:  If you were to spend the rest of your life with your urinary condition the way it is now, how would you feel about that? 2-Mostly Satisfied   Urine Leakage (Incontinence) 0-No Leakage       Recent Image (CT and/or KUB):   CT Abdomen and Pelvis: No results found for this or any previous visit.     CT Stone Protocol: No results found for this or any previous visit.     KUB: No results found for this or any previous visit.       Labs:  Brief Urine Lab Results       None          No visits with results within 3 Month(s) from this visit.   Latest known visit with results is:   Lab on 03/13/2024   Component Date Value Ref Range Status    PSA 03/13/2024 6.760 (H)  0.000 - 4.000 ng/mL Final        Procedure: None  Procedures     I have reviewed and agree with the above PMH, PSH, FMH, social history, medications, allergies, and labs.     Assessment/Plan:   Problem List Items Addressed This Visit       Elevated prostate specific antigen (PSA) - Primary    Relevant Medications    finasteride (PROSCAR) 5 MG tablet    Other Relevant Orders    PSA Diagnostic    Benign prostatic hyperplasia without lower urinary tract symptoms    Relevant Medications    finasteride (PROSCAR) 5 MG tablet         Health Maintenance:   Health Maintenance Due   Topic Date Due    COLORECTAL CANCER SCREENING  Never done    Pneumococcal Vaccine 65+ (1 of 2 - PCV) Never done    DIABETIC FOOT EXAM  Never done    DIABETIC EYE EXAM  Never done    ZOSTER VACCINE (1 of 2) Never done    HEPATITIS C SCREENING  Never done    ANNUAL WELLNESS VISIT  Never done    BMI FOLLOWUP  01/14/2022    TDAP/TD VACCINES (2 - Td or Tdap) 07/31/2022    RSV Vaccine - Adults (1 - 1-dose 75+ series) Never done    INFLUENZA VACCINE  Never done    COVID-19 Vaccine (3 - 2024-25 season) 09/01/2024    HEMOGLOBIN A1C  02/14/2025        Smoking Counseling: Everyday smoker.   Never used smokeless tobacco.  Counseling given however not ready to quit at this time.    Urine Incontinence: Patient reports that he is not currently experiencing any symptoms of urinary incontinence.    Patient was given instructions and counseling regarding his condition or for health maintenance advice. Please see specific information pulled into the AVS if appropriate.    Patient Education:   Elevated PSA with BPH -patient has been on finasteride with improvement in lower urinary tract symptoms we will continue with medication once daily.  Last PSA had decreased to 6.7 and recommended repeat PSA in office today.  I will call patient with results once available.  Did advise him return to the clinic sooner if he has any worsening lower urinary tract symptoms or concerns of nephrolithiasis.  Otherwise we will see him back in 1 year.    Visit Diagnoses:    ICD-10-CM ICD-9-CM   1. Elevated prostate specific antigen (PSA)  R97.20 790.93   2. Benign prostatic hyperplasia without lower urinary tract symptoms  N40.0 600.00     A total of 20 minutes were spent coordinating this patient’s care in clinic today; 12 minutes of which were face-to-face with the patient, reviewing medical history and counseling on the current treatment and followup plan.  All questions were answered to patient's satisfaction.      Meds Ordered During Visit:  New Medications Ordered This Visit   Medications    finasteride (PROSCAR) 5 MG tablet     Sig: Take 1 tablet by mouth Daily.     Dispense:  90 tablet     Refill:  3       Follow Up Appointment: 1 year  No follow-ups on file.      This document has been electronically signed by Augusto Huynh PA-C   December 20, 2024 10:57 EST    Part of this note may be an electronic transcription/translation of spoken language to printed text using the Dragon Dictation System.

## 2024-12-23 ENCOUNTER — TELEPHONE (OUTPATIENT)
Dept: UROLOGY | Facility: CLINIC | Age: 74
End: 2024-12-23
Payer: MEDICARE

## 2024-12-23 NOTE — TELEPHONE ENCOUNTER
Called advise patient PSA dropped to a 3 and recommended to keep 1 year follow-up.  He verbalized understanding